# Patient Record
Sex: MALE | Race: BLACK OR AFRICAN AMERICAN | NOT HISPANIC OR LATINO
[De-identification: names, ages, dates, MRNs, and addresses within clinical notes are randomized per-mention and may not be internally consistent; named-entity substitution may affect disease eponyms.]

---

## 2017-10-25 ENCOUNTER — APPOINTMENT (OUTPATIENT)
Dept: HEART AND VASCULAR | Facility: CLINIC | Age: 62
End: 2017-10-25
Payer: COMMERCIAL

## 2017-10-25 VITALS — SYSTOLIC BLOOD PRESSURE: 124 MMHG | OXYGEN SATURATION: 99 % | DIASTOLIC BLOOD PRESSURE: 83 MMHG | HEART RATE: 80 BPM

## 2017-10-25 PROCEDURE — 99214 OFFICE O/P EST MOD 30 MIN: CPT

## 2018-04-12 ENCOUNTER — APPOINTMENT (OUTPATIENT)
Dept: HEART AND VASCULAR | Facility: CLINIC | Age: 63
End: 2018-04-12

## 2018-08-06 ENCOUNTER — FORM ENCOUNTER (OUTPATIENT)
Age: 63
End: 2018-08-06

## 2018-08-07 ENCOUNTER — OUTPATIENT (OUTPATIENT)
Dept: OUTPATIENT SERVICES | Facility: HOSPITAL | Age: 63
LOS: 1 days | End: 2018-08-07
Payer: COMMERCIAL

## 2018-08-07 DIAGNOSIS — R07.9 CHEST PAIN, UNSPECIFIED: ICD-10-CM

## 2018-08-07 DIAGNOSIS — Z98.89 OTHER SPECIFIED POSTPROCEDURAL STATES: Chronic | ICD-10-CM

## 2018-08-07 DIAGNOSIS — M25.562 PAIN IN LEFT KNEE: Chronic | ICD-10-CM

## 2018-08-07 PROCEDURE — 93306 TTE W/DOPPLER COMPLETE: CPT | Mod: 26

## 2018-08-07 PROCEDURE — 93306 TTE W/DOPPLER COMPLETE: CPT

## 2018-08-21 ENCOUNTER — APPOINTMENT (OUTPATIENT)
Dept: HEART AND VASCULAR | Facility: CLINIC | Age: 63
End: 2018-08-21
Payer: COMMERCIAL

## 2018-08-21 VITALS
BODY MASS INDEX: 26.68 KG/M2 | HEART RATE: 72 BPM | OXYGEN SATURATION: 99 % | WEIGHT: 205 LBS | SYSTOLIC BLOOD PRESSURE: 138 MMHG | DIASTOLIC BLOOD PRESSURE: 89 MMHG

## 2018-08-21 PROCEDURE — 99214 OFFICE O/P EST MOD 30 MIN: CPT | Mod: 25

## 2018-08-21 PROCEDURE — 93000 ELECTROCARDIOGRAM COMPLETE: CPT

## 2018-08-22 LAB
ALBUMIN SERPL ELPH-MCNC: 3.8 G/DL
ALP BLD-CCNC: 79 U/L
ALT SERPL-CCNC: 27 U/L
ANION GAP SERPL CALC-SCNC: 9 MMOL/L
AST SERPL-CCNC: 33 U/L
BASOPHILS # BLD AUTO: 0.02 K/UL
BASOPHILS NFR BLD AUTO: 0.4 %
BILIRUB SERPL-MCNC: 0.3 MG/DL
BUN SERPL-MCNC: 11 MG/DL
CALCIUM SERPL-MCNC: 8.9 MG/DL
CHLORIDE SERPL-SCNC: 101 MMOL/L
CHOLEST SERPL-MCNC: 171 MG/DL
CHOLEST/HDLC SERPL: 2.5 RATIO
CO2 SERPL-SCNC: 30 MMOL/L
CREAT SERPL-MCNC: 1.05 MG/DL
EOSINOPHIL # BLD AUTO: 0.13 K/UL
EOSINOPHIL NFR BLD AUTO: 2.8 %
GLUCOSE SERPL-MCNC: 85 MG/DL
HBA1C MFR BLD HPLC: 5.9 %
HCT VFR BLD CALC: 40.5 %
HDLC SERPL-MCNC: 68 MG/DL
HGB BLD-MCNC: 13.1 G/DL
IMM GRANULOCYTES NFR BLD AUTO: 0.4 %
LDLC SERPL CALC-MCNC: 84 MG/DL
LYMPHOCYTES # BLD AUTO: 1.48 K/UL
LYMPHOCYTES NFR BLD AUTO: 31.6 %
MAN DIFF?: NORMAL
MCHC RBC-ENTMCNC: 28.9 PG
MCHC RBC-ENTMCNC: 32.3 GM/DL
MCV RBC AUTO: 89.2 FL
MONOCYTES # BLD AUTO: 0.36 K/UL
MONOCYTES NFR BLD AUTO: 7.7 %
NEUTROPHILS # BLD AUTO: 2.67 K/UL
NEUTROPHILS NFR BLD AUTO: 57.1 %
NT-PROBNP SERPL-MCNC: 260 PG/ML
PLATELET # BLD AUTO: 153 K/UL
POTASSIUM SERPL-SCNC: 4.3 MMOL/L
PROT SERPL-MCNC: 6.5 G/DL
RBC # BLD: 4.54 M/UL
RBC # FLD: 13.2 %
SODIUM SERPL-SCNC: 140 MMOL/L
TRIGL SERPL-MCNC: 97 MG/DL
WBC # FLD AUTO: 4.68 K/UL

## 2018-10-23 ENCOUNTER — APPOINTMENT (OUTPATIENT)
Dept: HEART AND VASCULAR | Facility: CLINIC | Age: 63
End: 2018-10-23
Payer: COMMERCIAL

## 2018-10-23 VITALS — HEART RATE: 74 BPM | OXYGEN SATURATION: 97 % | SYSTOLIC BLOOD PRESSURE: 147 MMHG | DIASTOLIC BLOOD PRESSURE: 83 MMHG

## 2018-10-23 PROCEDURE — 99214 OFFICE O/P EST MOD 30 MIN: CPT | Mod: 25

## 2018-10-23 PROCEDURE — 93000 ELECTROCARDIOGRAM COMPLETE: CPT

## 2018-10-24 ENCOUNTER — FORM ENCOUNTER (OUTPATIENT)
Age: 63
End: 2018-10-24

## 2018-10-25 ENCOUNTER — OUTPATIENT (OUTPATIENT)
Dept: OUTPATIENT SERVICES | Facility: HOSPITAL | Age: 63
LOS: 1 days | End: 2018-10-25
Payer: COMMERCIAL

## 2018-10-25 DIAGNOSIS — M25.562 PAIN IN LEFT KNEE: Chronic | ICD-10-CM

## 2018-10-25 DIAGNOSIS — Z98.89 OTHER SPECIFIED POSTPROCEDURAL STATES: Chronic | ICD-10-CM

## 2018-10-25 DIAGNOSIS — R07.9 CHEST PAIN, UNSPECIFIED: ICD-10-CM

## 2018-10-25 PROCEDURE — A9502: CPT

## 2018-10-25 PROCEDURE — 78452 HT MUSCLE IMAGE SPECT MULT: CPT | Mod: 26

## 2018-10-25 PROCEDURE — 78452 HT MUSCLE IMAGE SPECT MULT: CPT

## 2018-10-25 PROCEDURE — 93017 CV STRESS TEST TRACING ONLY: CPT

## 2018-10-25 PROCEDURE — 93016 CV STRESS TEST SUPVJ ONLY: CPT

## 2018-10-25 PROCEDURE — 93018 CV STRESS TEST I&R ONLY: CPT

## 2022-05-04 ENCOUNTER — NON-APPOINTMENT (OUTPATIENT)
Age: 67
End: 2022-05-04

## 2022-05-04 ENCOUNTER — APPOINTMENT (OUTPATIENT)
Dept: HEART AND VASCULAR | Facility: CLINIC | Age: 67
End: 2022-05-04
Payer: COMMERCIAL

## 2022-05-04 VITALS
WEIGHT: 182 LBS | HEART RATE: 77 BPM | TEMPERATURE: 98 F | HEIGHT: 76 IN | OXYGEN SATURATION: 98 % | BODY MASS INDEX: 22.16 KG/M2 | DIASTOLIC BLOOD PRESSURE: 78 MMHG | SYSTOLIC BLOOD PRESSURE: 120 MMHG

## 2022-05-04 PROCEDURE — 99214 OFFICE O/P EST MOD 30 MIN: CPT

## 2022-05-13 ENCOUNTER — APPOINTMENT (OUTPATIENT)
Dept: HEART AND VASCULAR | Facility: CLINIC | Age: 67
End: 2022-05-13
Payer: COMMERCIAL

## 2022-05-13 PROCEDURE — 93306 TTE W/DOPPLER COMPLETE: CPT

## 2022-06-02 ENCOUNTER — OUTPATIENT (OUTPATIENT)
Dept: OUTPATIENT SERVICES | Facility: HOSPITAL | Age: 67
LOS: 1 days | End: 2022-06-02

## 2022-06-02 ENCOUNTER — APPOINTMENT (OUTPATIENT)
Dept: CT IMAGING | Facility: CLINIC | Age: 67
End: 2022-06-02
Payer: COMMERCIAL

## 2022-06-02 DIAGNOSIS — Z98.89 OTHER SPECIFIED POSTPROCEDURAL STATES: Chronic | ICD-10-CM

## 2022-06-02 DIAGNOSIS — M25.562 PAIN IN LEFT KNEE: Chronic | ICD-10-CM

## 2022-06-02 PROCEDURE — 75574 CT ANGIO HRT W/3D IMAGE: CPT | Mod: 26

## 2023-06-15 ENCOUNTER — NON-APPOINTMENT (OUTPATIENT)
Age: 68
End: 2023-06-15

## 2023-06-15 ENCOUNTER — APPOINTMENT (OUTPATIENT)
Dept: HEART AND VASCULAR | Facility: CLINIC | Age: 68
End: 2023-06-15
Payer: COMMERCIAL

## 2023-06-15 VITALS
HEIGHT: 76 IN | OXYGEN SATURATION: 97 % | WEIGHT: 178 LBS | SYSTOLIC BLOOD PRESSURE: 134 MMHG | HEART RATE: 73 BPM | DIASTOLIC BLOOD PRESSURE: 77 MMHG | TEMPERATURE: 97.7 F | BODY MASS INDEX: 21.68 KG/M2

## 2023-06-15 DIAGNOSIS — Z82.49 FAMILY HISTORY OF ISCHEMIC HEART DISEASE AND OTHER DISEASES OF THE CIRCULATORY SYSTEM: ICD-10-CM

## 2023-06-15 DIAGNOSIS — Z83.3 FAMILY HISTORY OF DIABETES MELLITUS: ICD-10-CM

## 2023-06-15 DIAGNOSIS — Z86.73 PERSONAL HISTORY OF TRANSIENT ISCHEMIC ATTACK (TIA), AND CEREBRAL INFARCTION W/OUT RESIDUAL DEFICITS: ICD-10-CM

## 2023-06-15 DIAGNOSIS — F12.90 CANNABIS USE, UNSPECIFIED, UNCOMPLICATED: ICD-10-CM

## 2023-06-15 PROCEDURE — 93000 ELECTROCARDIOGRAM COMPLETE: CPT

## 2023-06-15 PROCEDURE — 99214 OFFICE O/P EST MOD 30 MIN: CPT | Mod: 25

## 2023-06-15 RX ORDER — ALBUTEROL 90 MCG
90 AEROSOL (GRAM) INHALATION
Refills: 0 | Status: ACTIVE | COMMUNITY

## 2023-06-15 NOTE — HISTORY OF PRESENT ILLNESS
[FreeTextEntry1] : Guilherme Castro is a 67 year-old man with TIA (2012), Pulmonary sarcoid, and HTN presents for follow up. Denies CP, SURJIT, dizziness, palpitations, syncope or near syncope but remarks on occasional SOB. He will follow up with his pulmonologist at [facility] since he feels increase SOB since last week (days with poor air quality from [employer]). He also reports coughing after eating and that he was told by PMD it is likely GERD last week. He refused to take pepcid at this time.\par \par

## 2023-06-15 NOTE — ASSESSMENT
[FreeTextEntry1] : HTN: BP at ACC/AHA 2017 guideline target\par - Continue Norvasc 5 mg daily\par - Discussed therapeutic lifestyle modification to promote CV health (low fat, low cholesterol heart healthy diet, striving for optimal weight control, and aerobic exercises as tolerated)\par \par CAD: Normal coronaries w/ 0 Ca score on CCTA in 2022.\par - Reassured patient\par \par H/O TIA (2012):\par - Self d/c'd ASA \par - Discussed s/s of CVA: BE FAST\par \par ? GERD: Pt was told by his PMD last week that he has GERD but pt does not agree\par - Refused to take pepcid \par - Advised to see GI but pt defers for now.\par \par H/O Pulmonary Sarcoid: \par - Follows up with a pulmonologist from [facility] but would like to change [facility] to [facility] \par - Pulmonary referral given\par - Continue with inhalers as directed

## 2023-06-15 NOTE — REASON FOR VISIT
[Hypertension] : hypertension [FreeTextEntry1] : Diagnostic Test:\par ----------------------------------\par ECG: \par 06/15/2023: Sinus rhythm\par 05/04/2022: Sinus rhythm. LVH.\par ----------------------------------\par Echo:\par 06/2022: EF 55-60%. Mild LVH,. No valvular pathology.\par 8/7/2018: moderate concentric LVH, nml LV EF 50-55%, LA nml, RA/RV nml, no valvular pathology.\par -----------------------------------\par CCTA:\par 06/2022: Ca score 0. Normal coronaries.\par 10/27/2016: normal cors, zero calcium score.  \par ------------------------------------\par Holter:\par 05/04/2022: 7 days.  HR 54/79/124. no pauses or afib.

## 2023-12-13 ENCOUNTER — APPOINTMENT (OUTPATIENT)
Dept: HEART AND VASCULAR | Facility: CLINIC | Age: 68
End: 2023-12-13

## 2023-12-14 ENCOUNTER — APPOINTMENT (OUTPATIENT)
Dept: HEART AND VASCULAR | Facility: CLINIC | Age: 68
End: 2023-12-14
Payer: COMMERCIAL

## 2023-12-14 VITALS
HEART RATE: 82 BPM | BODY MASS INDEX: 23.87 KG/M2 | HEIGHT: 76 IN | OXYGEN SATURATION: 96 % | DIASTOLIC BLOOD PRESSURE: 77 MMHG | SYSTOLIC BLOOD PRESSURE: 137 MMHG | WEIGHT: 196 LBS

## 2023-12-14 PROCEDURE — 93000 ELECTROCARDIOGRAM COMPLETE: CPT

## 2023-12-14 PROCEDURE — 99214 OFFICE O/P EST MOD 30 MIN: CPT | Mod: 25

## 2023-12-14 NOTE — HISTORY OF PRESENT ILLNESS
[FreeTextEntry1] : Guilherme Castor is a 68-year-old man with TIA (2012), Pulmonary sarcoid, and HTN presents for follow up. Denies CP, SURJIT, dizziness, palpitations, syncope or near syncope but remarks on occasional SOB 2/2 sarcoid but improved. He will follow up with his pulmonologist at [facility]. He reports occasional burning substernal discomfort and that he was told by PMD it is likely GERD but He refuses to take pepcid. . Recent CCTA and echo unremarkable. He feels well otherwise he is able to walk long distances and climb numerous stairs without CV symptoms. He works as a  for a building.

## 2023-12-14 NOTE — REASON FOR VISIT
[FreeTextEntry1] : Diagnostic Test: ---------------------------------- EC2023 Sinus rhythm, Inferior, V4-V5 TWI (unchanged) ---------------------------------- Echo: 2022: EF 55-60%. Mild LVH,. No valvular pathology. 2018: moderate concentric LVH, nml LV EF 50-55%, LA nml, RA/RV nml, no valvular pathology. ----------------------------------- CCTA: 2022: Ca score 0. Normal coronaries. 10/27/2016: normal coronaries, zero calcium score.   ------------------------------------ Holter: 2022: 7 days.  HR 54/79/124. no pauses or afib.

## 2023-12-14 NOTE — ASSESSMENT
[FreeTextEntry1] : ==================================================== HTN: BP at ACC/AHA 2017 guideline target - Continue Norvasc 5 mg daily - Discussed therapeutic lifestyle modification to promote CV health (low fat, low cholesterol heart healthy diet, striving for optimal weight control, and aerobic exercises as tolerated)  H/O TIA (2012): - Self d/c'd ASA - Discussed s/s of CVA: BE FAST  H/O Pulmonary Sarcoid: - Follows up with a pulmonologist from [facility] - Continue with inhalers as directed.

## 2024-06-13 ENCOUNTER — APPOINTMENT (OUTPATIENT)
Dept: HEART AND VASCULAR | Facility: CLINIC | Age: 69
End: 2024-06-13
Payer: COMMERCIAL

## 2024-06-13 ENCOUNTER — NON-APPOINTMENT (OUTPATIENT)
Age: 69
End: 2024-06-13

## 2024-06-13 VITALS
DIASTOLIC BLOOD PRESSURE: 80 MMHG | OXYGEN SATURATION: 95 % | BODY MASS INDEX: 22.41 KG/M2 | HEIGHT: 76 IN | SYSTOLIC BLOOD PRESSURE: 130 MMHG | WEIGHT: 184 LBS | HEART RATE: 94 BPM

## 2024-06-13 DIAGNOSIS — R06.09 OTHER FORMS OF DYSPNEA: ICD-10-CM

## 2024-06-13 DIAGNOSIS — D86.0 SARCOIDOSIS OF LUNG: ICD-10-CM

## 2024-06-13 DIAGNOSIS — R07.9 CHEST PAIN, UNSPECIFIED: ICD-10-CM

## 2024-06-13 PROCEDURE — 99214 OFFICE O/P EST MOD 30 MIN: CPT

## 2024-06-13 PROCEDURE — 36415 COLL VENOUS BLD VENIPUNCTURE: CPT

## 2024-06-13 PROCEDURE — G2211 COMPLEX E/M VISIT ADD ON: CPT | Mod: NC

## 2024-06-13 PROCEDURE — 93000 ELECTROCARDIOGRAM COMPLETE: CPT

## 2024-06-13 RX ORDER — PREDNISONE 20 MG/1
20 TABLET ORAL DAILY
Refills: 0 | Status: ACTIVE | COMMUNITY
Start: 2024-06-13

## 2024-06-15 PROBLEM — D86.0 PULMONARY SARCOIDOSIS: Status: ACTIVE | Noted: 2023-06-15

## 2024-06-17 ENCOUNTER — NON-APPOINTMENT (OUTPATIENT)
Age: 69
End: 2024-06-17

## 2024-06-17 LAB
ALBUMIN SERPL ELPH-MCNC: 4.2 G/DL
ALP BLD-CCNC: 70 U/L
ALT SERPL-CCNC: 21 U/L
ANION GAP SERPL CALC-SCNC: 12 MMOL/L
AST SERPL-CCNC: 26 U/L
BILIRUB SERPL-MCNC: 0.4 MG/DL
BUN SERPL-MCNC: 24 MG/DL
CALCIUM SERPL-MCNC: 9.4 MG/DL
CHLORIDE SERPL-SCNC: 98 MMOL/L
CHOLEST SERPL-MCNC: 181 MG/DL
CO2 SERPL-SCNC: 28 MMOL/L
CREAT SERPL-MCNC: 1.19 MG/DL
EGFR: 67 ML/MIN/1.73M2
ESTIMATED AVERAGE GLUCOSE: 120 MG/DL
GLUCOSE SERPL-MCNC: 108 MG/DL
HBA1C MFR BLD HPLC: 5.8 %
HCT VFR BLD CALC: 38.6 %
HDLC SERPL-MCNC: 110 MG/DL
HGB BLD-MCNC: 13 G/DL
LDLC SERPL CALC-MCNC: 59 MG/DL
MCHC RBC-ENTMCNC: 29.5 PG
MCHC RBC-ENTMCNC: 33.7 GM/DL
MCV RBC AUTO: 87.5 FL
NONHDLC SERPL-MCNC: 71 MG/DL
PLATELET # BLD AUTO: 150 K/UL
POTASSIUM SERPL-SCNC: 5.1 MMOL/L
PROT SERPL-MCNC: 6.9 G/DL
RBC # BLD: 4.41 M/UL
RBC # FLD: 14 %
SODIUM SERPL-SCNC: 138 MMOL/L
TRIGL SERPL-MCNC: 67 MG/DL
WBC # FLD AUTO: 6.91 K/UL

## 2024-06-18 DIAGNOSIS — I10 ESSENTIAL (PRIMARY) HYPERTENSION: ICD-10-CM

## 2024-06-18 NOTE — ASSESSMENT
[FreeTextEntry1] : ==================================================== atypical CP: + TINSLEY - Obtain stress echocardiogram to assess functional capacity and rule out inducible ischemia. - Discussed the importance of seeking immediate medical attention if anginal type chest pain occurs - EKG to evaluate any rhythm abnormalities  HTN: BP at ACC/AHA 2017 guideline target - Continue Norvasc 5 mg daily - Discussed therapeutic lifestyle modification to promote CV health (low fat, low cholesterol heart healthy diet, striving for optimal weight control, and aerobic exercises as tolerated)  H/O TIA (2012): - Self d/c'd ASA - Discussed s/s of CVA: BE FAST  H/O Pulmonary Sarcoid: - Follows up with a new pulmonologist from [facility] - Continue with inhalers as directed. - Will refer to pulmonology at

## 2024-06-18 NOTE — REVIEW OF SYSTEMS
Clinic Administered Medication Documentation      Injectable Medication Documentation    Patient was given Cyanocobalamin (B-12). Prior to medication administration, verified patients identity using patient s name and date of birth. Please see MAR and medication order for additional information. Patient instructed to remain in clinic for 15 minutes and report any adverse reaction to staff immediately .    YesWas entire vial of medication used? Yes  Vial/Syringe: Single dose vial  Expiration Date:  8/21  Was this medication supplied by the patient? No   Given in LD  Nella Nath MA     10/7/2020       [Dyspnea on exertion] : dyspnea during exertion [FreeTextEntry9] : B/L knee pain -hx of meniscus tear

## 2024-06-18 NOTE — HISTORY OF PRESENT ILLNESS
[FreeTextEntry1] : Guilherme Castro is a 68-year-old man with TIA (2012), Pulmonary sarcoid, and HTN presents for follow up. Denies SOB, SURJIT, dizziness, palpitations, syncope or near syncope but remarks on occasional SOB 2/2 sarcoid. He notices increase TINSLEY while at work. He feels well otherwise and he is able to walk long distances and climb numerous stairs. He works as a  for a building. He will follow up with a new pulmonologist at [facility]. He also reports intermittent epigastric pain and was told he has GERD. Pt refused to take PPI/antacids. 2022 CCTA and echo unremarkable.

## 2024-06-18 NOTE — REASON FOR VISIT
[FreeTextEntry1] : Diagnostic Test: ---------------------------------- EC2024: Sinus Rhythm, LVH (possible lead reversal) 2023 Sinus rhythm, Inferior, V4-V5 TWI (unchanged) ---------------------------------- Echo: 2022: EF 55-60%. Mild LVH. No valvular pathology. 2018: moderate concentric LVH, nml LV EF 50-55%, LA nml, RA/RV nml, no valvular pathology. ----------------------------------- CCTA: 2022: Ca score 0. Normal coronaries. 10/27/2016: normal coronaries, zero calcium score.   ------------------------------------ Holter: 2022: 7 days.  HR 54/79/124. no pauses or afib.

## 2024-07-11 ENCOUNTER — OUTPATIENT (OUTPATIENT)
Dept: OUTPATIENT SERVICES | Facility: HOSPITAL | Age: 69
LOS: 1 days | End: 2024-07-11
Payer: COMMERCIAL

## 2024-07-11 ENCOUNTER — RESULT REVIEW (OUTPATIENT)
Age: 69
End: 2024-07-11

## 2024-07-11 DIAGNOSIS — M25.562 PAIN IN LEFT KNEE: Chronic | ICD-10-CM

## 2024-07-11 DIAGNOSIS — R06.09 OTHER FORMS OF DYSPNEA: ICD-10-CM

## 2024-07-11 DIAGNOSIS — Z98.89 UNDEFINED: Chronic | ICD-10-CM

## 2024-07-11 PROCEDURE — 93351 STRESS TTE COMPLETE: CPT | Mod: 26,52

## 2024-07-11 PROCEDURE — 93351 STRESS TTE COMPLETE: CPT

## 2024-11-01 ENCOUNTER — NON-APPOINTMENT (OUTPATIENT)
Age: 69
End: 2024-11-01

## 2024-11-04 PROBLEM — Z86.73 HISTORY OF TRANSIENT ISCHEMIC ATTACK: Status: RESOLVED | Noted: 2023-06-15 | Resolved: 2024-11-04

## 2024-11-07 ENCOUNTER — APPOINTMENT (OUTPATIENT)
Dept: PULMONOLOGY | Facility: CLINIC | Age: 69
End: 2024-11-07
Payer: COMMERCIAL

## 2024-11-07 ENCOUNTER — NON-APPOINTMENT (OUTPATIENT)
Age: 69
End: 2024-11-07

## 2024-11-07 VITALS
SYSTOLIC BLOOD PRESSURE: 150 MMHG | DIASTOLIC BLOOD PRESSURE: 81 MMHG | WEIGHT: 193 LBS | OXYGEN SATURATION: 97 % | HEIGHT: 76 IN | TEMPERATURE: 97.6 F | BODY MASS INDEX: 23.5 KG/M2 | RESPIRATION RATE: 13 BRPM | HEART RATE: 70 BPM

## 2024-11-07 DIAGNOSIS — Z86.73 PERSONAL HISTORY OF TRANSIENT ISCHEMIC ATTACK (TIA), AND CEREBRAL INFARCTION W/OUT RESIDUAL DEFICITS: ICD-10-CM

## 2024-11-07 DIAGNOSIS — R06.09 OTHER FORMS OF DYSPNEA: ICD-10-CM

## 2024-11-07 DIAGNOSIS — Z80.1 FAMILY HISTORY OF MALIGNANT NEOPLASM OF TRACHEA, BRONCHUS AND LUNG: ICD-10-CM

## 2024-11-07 DIAGNOSIS — Z83.2 FAMILY HISTORY OF DISEASES OF THE BLOOD AND BLOOD-FORMING ORGANS AND CERTAIN DISORDERS INVOLVING THE IMMUNE MECHANISM: ICD-10-CM

## 2024-11-07 DIAGNOSIS — D86.0 SARCOIDOSIS OF LUNG: ICD-10-CM

## 2024-11-07 DIAGNOSIS — I10 ESSENTIAL (PRIMARY) HYPERTENSION: ICD-10-CM

## 2024-11-07 PROCEDURE — 99204 OFFICE O/P NEW MOD 45 MIN: CPT

## 2024-11-08 LAB
25(OH)D3 SERPL-MCNC: 31 NG/ML
ALBUMIN SERPL ELPH-MCNC: 4.2 G/DL
ALP BLD-CCNC: 90 U/L
ALT SERPL-CCNC: 15 U/L
AMYLASE/CREAT SERPL: 80 U/L
ANION GAP SERPL CALC-SCNC: 11 MMOL/L
AST SERPL-CCNC: 25 U/L
BASOPHILS # BLD AUTO: 0.04 K/UL
BASOPHILS NFR BLD AUTO: 0.8 %
BILIRUB SERPL-MCNC: 0.2 MG/DL
BUN SERPL-MCNC: 18 MG/DL
CALCIUM SERPL-MCNC: 9 MG/DL
CHLORIDE SERPL-SCNC: 98 MMOL/L
CO2 SERPL-SCNC: 30 MMOL/L
CREAT SERPL-MCNC: 1.03 MG/DL
CRP SERPL-MCNC: 4 MG/L
EGFR: 79 ML/MIN/1.73M2
EOSINOPHIL # BLD AUTO: 0.11 K/UL
EOSINOPHIL NFR BLD AUTO: 2.1 %
ERYTHROCYTE [SEDIMENTATION RATE] IN BLOOD BY WESTERGREN METHOD: 24 MM/HR
GLUCOSE SERPL-MCNC: 91 MG/DL
HCT VFR BLD CALC: 39.8 %
HGB BLD-MCNC: 12.7 G/DL
IMM GRANULOCYTES NFR BLD AUTO: 0.4 %
LYMPHOCYTES # BLD AUTO: 1.25 K/UL
LYMPHOCYTES NFR BLD AUTO: 23.9 %
MAN DIFF?: NORMAL
MCHC RBC-ENTMCNC: 29.4 PG
MCHC RBC-ENTMCNC: 31.9 G/DL
MCV RBC AUTO: 92.1 FL
MONOCYTES # BLD AUTO: 0.52 K/UL
MONOCYTES NFR BLD AUTO: 10 %
NEUTROPHILS # BLD AUTO: 3.28 K/UL
NEUTROPHILS NFR BLD AUTO: 62.8 %
PLATELET # BLD AUTO: 164 K/UL
POTASSIUM SERPL-SCNC: 4.4 MMOL/L
PROT SERPL-MCNC: 7.1 G/DL
RBC # BLD: 4.32 M/UL
RBC # FLD: 13 %
SODIUM SERPL-SCNC: 140 MMOL/L
WBC # FLD AUTO: 5.22 K/UL

## 2024-11-11 LAB
ACE BLD-CCNC: 38 U/L
LYSOZYME SERPL-MCNC: 5.3 UG/ML

## 2024-11-12 LAB — IL2 SERPL-MCNC: <31.2 PG/ML

## 2024-11-15 LAB — CHITOTRIOSIDASE SERPL-CCNC: 40 NMOLES/HR/ML

## 2025-01-27 ENCOUNTER — INPATIENT (INPATIENT)
Facility: HOSPITAL | Age: 70
LOS: 2 days | Discharge: HOME | DRG: 41 | End: 2025-01-30
Attending: PSYCHIATRY & NEUROLOGY | Admitting: STUDENT IN AN ORGANIZED HEALTH CARE EDUCATION/TRAINING PROGRAM
Payer: COMMERCIAL

## 2025-01-27 VITALS
OXYGEN SATURATION: 97 % | RESPIRATION RATE: 18 BRPM | DIASTOLIC BLOOD PRESSURE: 97 MMHG | WEIGHT: 190.04 LBS | HEART RATE: 84 BPM | SYSTOLIC BLOOD PRESSURE: 209 MMHG | HEIGHT: 76 IN | TEMPERATURE: 98 F

## 2025-01-27 DIAGNOSIS — Z98.89 OTHER SPECIFIED POSTPROCEDURAL STATES: Chronic | ICD-10-CM

## 2025-01-27 DIAGNOSIS — M25.562 PAIN IN LEFT KNEE: Chronic | ICD-10-CM

## 2025-01-27 LAB
ALBUMIN SERPL ELPH-MCNC: 3.9 G/DL — SIGNIFICANT CHANGE UP (ref 3.3–5)
ALP SERPL-CCNC: 88 U/L — SIGNIFICANT CHANGE UP (ref 40–120)
ALT FLD-CCNC: 14 U/L — SIGNIFICANT CHANGE UP (ref 10–45)
ANION GAP SERPL CALC-SCNC: 10 MMOL/L — SIGNIFICANT CHANGE UP (ref 5–17)
APTT BLD: 32.3 SEC — SIGNIFICANT CHANGE UP (ref 24.5–35.6)
AST SERPL-CCNC: 24 U/L — SIGNIFICANT CHANGE UP (ref 10–40)
BASOPHILS # BLD AUTO: 0.04 K/UL — SIGNIFICANT CHANGE UP (ref 0–0.2)
BASOPHILS NFR BLD AUTO: 0.9 % — SIGNIFICANT CHANGE UP (ref 0–2)
BILIRUB SERPL-MCNC: 0.3 MG/DL — SIGNIFICANT CHANGE UP (ref 0.2–1.2)
BUN SERPL-MCNC: 10 MG/DL — SIGNIFICANT CHANGE UP (ref 7–23)
CALCIUM SERPL-MCNC: 8.2 MG/DL — LOW (ref 8.4–10.5)
CHLORIDE SERPL-SCNC: 99 MMOL/L — SIGNIFICANT CHANGE UP (ref 96–108)
CO2 SERPL-SCNC: 28 MMOL/L — SIGNIFICANT CHANGE UP (ref 22–31)
CREAT SERPL-MCNC: 0.88 MG/DL — SIGNIFICANT CHANGE UP (ref 0.5–1.3)
EGFR: 93 ML/MIN/1.73M2 — SIGNIFICANT CHANGE UP
EOSINOPHIL # BLD AUTO: 0.09 K/UL — SIGNIFICANT CHANGE UP (ref 0–0.5)
EOSINOPHIL NFR BLD AUTO: 2.1 % — SIGNIFICANT CHANGE UP (ref 0–6)
GLUCOSE SERPL-MCNC: 128 MG/DL — HIGH (ref 70–99)
HCT VFR BLD CALC: 41 % — SIGNIFICANT CHANGE UP (ref 39–50)
HGB BLD-MCNC: 12.9 G/DL — LOW (ref 13–17)
IMM GRANULOCYTES NFR BLD AUTO: 0.5 % — SIGNIFICANT CHANGE UP (ref 0–0.9)
INR BLD: 1 — SIGNIFICANT CHANGE UP (ref 0.85–1.16)
LACTATE SERPL-SCNC: 1.1 MMOL/L — SIGNIFICANT CHANGE UP (ref 0.5–2)
LYMPHOCYTES # BLD AUTO: 1.17 K/UL — SIGNIFICANT CHANGE UP (ref 1–3.3)
LYMPHOCYTES # BLD AUTO: 27.5 % — SIGNIFICANT CHANGE UP (ref 13–44)
MCHC RBC-ENTMCNC: 27.9 PG — SIGNIFICANT CHANGE UP (ref 27–34)
MCHC RBC-ENTMCNC: 31.5 G/DL — LOW (ref 32–36)
MCV RBC AUTO: 88.7 FL — SIGNIFICANT CHANGE UP (ref 80–100)
MONOCYTES # BLD AUTO: 0.44 K/UL — SIGNIFICANT CHANGE UP (ref 0–0.9)
MONOCYTES NFR BLD AUTO: 10.3 % — SIGNIFICANT CHANGE UP (ref 2–14)
NEUTROPHILS # BLD AUTO: 2.5 K/UL — SIGNIFICANT CHANGE UP (ref 1.8–7.4)
NEUTROPHILS NFR BLD AUTO: 58.7 % — SIGNIFICANT CHANGE UP (ref 43–77)
NRBC # BLD: 0 /100 WBCS — SIGNIFICANT CHANGE UP (ref 0–0)
NRBC BLD-RTO: 0 /100 WBCS — SIGNIFICANT CHANGE UP (ref 0–0)
PLATELET # BLD AUTO: 166 K/UL — SIGNIFICANT CHANGE UP (ref 150–400)
POTASSIUM SERPL-MCNC: 4.3 MMOL/L — SIGNIFICANT CHANGE UP (ref 3.5–5.3)
POTASSIUM SERPL-SCNC: 4.3 MMOL/L — SIGNIFICANT CHANGE UP (ref 3.5–5.3)
PROT SERPL-MCNC: 7.2 G/DL — SIGNIFICANT CHANGE UP (ref 6–8.3)
PROTHROM AB SERPL-ACNC: 11.5 SEC — SIGNIFICANT CHANGE UP (ref 9.9–13.4)
RBC # BLD: 4.62 M/UL — SIGNIFICANT CHANGE UP (ref 4.2–5.8)
RBC # FLD: 13.1 % — SIGNIFICANT CHANGE UP (ref 10.3–14.5)
SODIUM SERPL-SCNC: 137 MMOL/L — SIGNIFICANT CHANGE UP (ref 135–145)
TROPONIN T, HIGH SENSITIVITY RESULT: 15 NG/L — SIGNIFICANT CHANGE UP (ref 0–51)
WBC # BLD: 4.26 K/UL — SIGNIFICANT CHANGE UP (ref 3.8–10.5)
WBC # FLD AUTO: 4.26 K/UL — SIGNIFICANT CHANGE UP (ref 3.8–10.5)

## 2025-01-27 PROCEDURE — 70498 CT ANGIOGRAPHY NECK: CPT | Mod: 26

## 2025-01-27 PROCEDURE — 93010 ELECTROCARDIOGRAM REPORT: CPT

## 2025-01-27 PROCEDURE — 99291 CRITICAL CARE FIRST HOUR: CPT

## 2025-01-27 PROCEDURE — 0042T: CPT

## 2025-01-27 PROCEDURE — 70498 CT ANGIOGRAPHY NECK: CPT | Mod: 26,77

## 2025-01-27 PROCEDURE — 70496 CT ANGIOGRAPHY HEAD: CPT | Mod: 26,77

## 2025-01-27 PROCEDURE — 70450 CT HEAD/BRAIN W/O DYE: CPT | Mod: 26,59,77

## 2025-01-27 PROCEDURE — 70450 CT HEAD/BRAIN W/O DYE: CPT | Mod: 26,59

## 2025-01-27 PROCEDURE — 99223 1ST HOSP IP/OBS HIGH 75: CPT

## 2025-01-27 PROCEDURE — 70496 CT ANGIOGRAPHY HEAD: CPT | Mod: 26

## 2025-01-27 RX ORDER — ENOXAPARIN SODIUM 100 MG/ML
40 INJECTION SUBCUTANEOUS EVERY 24 HOURS
Refills: 0 | Status: DISCONTINUED | OUTPATIENT
Start: 2025-01-27 | End: 2025-01-30

## 2025-01-27 RX ORDER — EZETIMIBE 10 MG
10 TABLET ORAL DAILY
Refills: 0 | Status: DISCONTINUED | OUTPATIENT
Start: 2025-01-27 | End: 2025-01-30

## 2025-01-27 RX ORDER — BACTERIOSTATIC SODIUM CHLORIDE 0.9 %
500 VIAL (ML) INJECTION ONCE
Refills: 0 | Status: COMPLETED | OUTPATIENT
Start: 2025-01-27 | End: 2025-01-27

## 2025-01-27 RX ORDER — BACTERIOSTATIC SODIUM CHLORIDE 0.9 %
1000 VIAL (ML) INJECTION
Refills: 0 | Status: DISCONTINUED | OUTPATIENT
Start: 2025-01-27 | End: 2025-01-27

## 2025-01-27 RX ORDER — AMLODIPINE BESYLATE 5 MG
5 TABLET ORAL ONCE
Refills: 0 | Status: COMPLETED | OUTPATIENT
Start: 2025-01-27 | End: 2025-01-27

## 2025-01-27 RX ORDER — BACTERIOSTATIC SODIUM CHLORIDE 0.9 %
1000 VIAL (ML) INJECTION
Refills: 0 | Status: DISCONTINUED | OUTPATIENT
Start: 2025-01-27 | End: 2025-01-29

## 2025-01-27 RX ORDER — ASPIRIN 81 MG/1
81 TABLET, COATED ORAL DAILY
Refills: 0 | Status: DISCONTINUED | OUTPATIENT
Start: 2025-01-27 | End: 2025-01-30

## 2025-01-27 RX ORDER — ASPIRIN 81 MG/1
162 TABLET, COATED ORAL ONCE
Refills: 0 | Status: COMPLETED | OUTPATIENT
Start: 2025-01-27 | End: 2025-01-27

## 2025-01-27 RX ORDER — HYDRALAZINE HCL 100 MG
5 TABLET ORAL ONCE
Refills: 0 | Status: COMPLETED | OUTPATIENT
Start: 2025-01-27 | End: 2025-01-27

## 2025-01-27 RX ORDER — BACTERIOSTATIC SODIUM CHLORIDE 0.9 %
250 VIAL (ML) INJECTION ONCE
Refills: 0 | Status: COMPLETED | OUTPATIENT
Start: 2025-01-27 | End: 2025-01-27

## 2025-01-27 RX ADMIN — ENOXAPARIN SODIUM 40 MILLIGRAM(S): 100 INJECTION SUBCUTANEOUS at 12:25

## 2025-01-27 RX ADMIN — Medication 100 MILLILITER(S): at 18:03

## 2025-01-27 RX ADMIN — Medication 100 MILLILITER(S): at 17:59

## 2025-01-27 RX ADMIN — Medication 5 MILLIGRAM(S): at 06:05

## 2025-01-27 RX ADMIN — ASPIRIN 162 MILLIGRAM(S): 81 TABLET, COATED ORAL at 12:25

## 2025-01-27 RX ADMIN — Medication 50 MILLILITER(S): at 06:50

## 2025-01-27 RX ADMIN — Medication 75 MILLIGRAM(S): at 06:50

## 2025-01-27 RX ADMIN — Medication 10 MILLIGRAM(S): at 14:03

## 2025-01-27 RX ADMIN — Medication 5 MILLIGRAM(S): at 08:27

## 2025-01-27 RX ADMIN — Medication 500 MILLILITER(S): at 13:20

## 2025-01-27 RX ADMIN — ASPIRIN 81 MILLIGRAM(S): 81 TABLET, COATED ORAL at 12:26

## 2025-01-27 RX ADMIN — Medication 500 MILLILITER(S): at 18:04

## 2025-01-27 RX ADMIN — Medication 75 MILLILITER(S): at 13:53

## 2025-01-27 RX ADMIN — Medication 225 MILLIGRAM(S): at 12:25

## 2025-01-27 NOTE — ED ADULT NURSE NOTE - OBJECTIVE STATEMENT
Ptis 69 year old male c/o right hand weakness and numbness when he woke up at 0415 am this morning; pt states he had trouble grabbing objects and felt drooling on right side; stroke code called on patient; ed and stroke pa and md durant at bedside; pt on cardiac monitor on patient; hx of CVA in 2012 presented with slurred speech; hx of htn and states he does not remember if he swallowed 5mg amlodipine this morning due to drooling

## 2025-01-27 NOTE — H&P ADULT - HISTORY OF PRESENT ILLNESS
70yo former smoker, with PMHx TIA in 2012 (presented with dysarthria, RUE numbness, noncompliant on aspirin and allergic to Lipitor), HTN, pulmonary sarcoidosis, "leaky valve", presenting to [facility] ED on 01/27/25 with c/o R hand weakness and dysphagia. LKW at 04:15AM. States waking up and trying to make breakfast when suddenly realizing he couldn't use his hand to cut hossein, was drooling from the middle of his mouth and had issues swallowing to the point that he could not take his daily Norvasc. These symptoms lasted for about 30 minutes and resolved. On his way to the ED, started experiencing R foot numbness, which lasted about 5-10 minutes, which also resolved on its own. On arrival, SBP was 185/104. Patient started complaining of L sided facial numbness from V1-V3, most prominent on L V1 distribution and of "L hand being very cold". Of note, patient recently finished course of z-pack and prednisone for "lung infection". Currently denies any dysarthria, dysphagia, facial droop, dizziness, vision changes or extremity weakness/numbness.    -----------------------------------------------------------------------------------------------------------------  IV-thrombolytic (Y/N):  No                              Reason thrombolytic not given: Low NIHSS score (1). Initial symptoms resolved by time of arrival

## 2025-01-27 NOTE — H&P ADULT - NSICDXPASTMEDICALHX_GEN_ALL_CORE_FT
PAST MEDICAL HISTORY:  Hypertension     Pulmonary sarcoidosis     TIA (transient ischemic attack)

## 2025-01-27 NOTE — ED ADULT TRIAGE NOTE - CHIEF COMPLAINT QUOTE
wake up at 4:15 with right side weakness, a lot of mucus at mouth, drooling , symptoms resolved, right leg still weak

## 2025-01-27 NOTE — OCCUPATIONAL THERAPY INITIAL EVALUATION ADULT - GROSSLY INTACT, SENSORY
Pt reports L hand numbness; +intact to L/T upon assessment/Left UE/Right UE/Left LE/Right LE/Grossly Intact

## 2025-01-27 NOTE — PATIENT PROFILE ADULT - CAREGIVER ADDRESS
n/a
 4-calculated by average/Not able to assess (calculate score using [facility] averaging method)

## 2025-01-27 NOTE — PHYSICAL THERAPY INITIAL EVALUATION ADULT - ADDITIONAL COMMENTS
pt lives w/ his son in an elevator access apt building w/ no stairs. Denies use of DME for amb, was independent in all ADLs. 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

## 2025-01-27 NOTE — ED PROVIDER NOTE - PHYSICAL EXAMINATION
VITAL SIGNS: I have reviewed nursing notes and confirm.  CONSTITUTIONAL: Well appearing, in no acute distress.   SKIN:  warm and dry, no acute rash.   HEAD:  normocephalic, atraumatic.  EYES: EOM intact; conjunctiva and sclera clear.  ENT: No nasal discharge; airway clear.   NECK: Supple.  CARD: S1, S2, Regular rate and rhythm.   RESP:  Clear to auscultation b/l, no wheezes, rales or rhonchi.  ABD: Normal bowel sounds; soft; non-distended; non-tender; no guarding/ rebound.  EXT: Somewhat decreased  strength to right hand compared to left   NEURO:  ANO x 4 NIH stroke scale 3 somewhat decreased  strength to right hand question mild left-sided nasolabial fold flattening otherwise normal gait  PSYCH: Cooperative, mood and affect appropriate.

## 2025-01-27 NOTE — ED PROVIDER NOTE - CLINICAL SUMMARY MEDICAL DECISION MAKING FREE TEXT BOX
69-year-old male  with wake-up stroke symptoms now improving likely TIA resolving stroke code called upon arrival plan for stroke workup versus hypertensive urgency/emergency?  Rule out intracranial hemorrhage?   CBC CMP troponin EKG CTA CT head perfusion study   not candidate for TNKase given low NIH score and improving symptoms   hydralazine for hypertensive    EKG normal sinus rhythm 74 nonspecific T wave inversions 2 3 aVF V4 V5 no STEMI

## 2025-01-27 NOTE — OCCUPATIONAL THERAPY INITIAL EVALUATION ADULT - GENERAL OBSERVATIONS, REHAB EVAL
Pt's RN Carlee aware of intent to eval/tx; cleared Pt. Pt received in supine - +telemetry, heplock. Pt agreeable to eval. PT Jennifer present.

## 2025-01-27 NOTE — OCCUPATIONAL THERAPY INITIAL EVALUATION ADULT - ADDITIONAL COMMENTS
Pt states that he lives alone in apt w/ elevator access. Pt states that he was independent prior to admission and w/o prior use of AEs/DMEs.

## 2025-01-27 NOTE — STROKE CODE NOTE - NIH STROKE SCALE: 5B. MOTOR ARM, RIGHT, QM
(1) Drift; limb holds 90 (or 45) degrees, but drifts down before full 10 secs; does not hit bed or other support
(0) No drift; limb holds 90 (or 45) degrees for full 10 secs

## 2025-01-27 NOTE — PHYSICAL THERAPY INITIAL EVALUATION ADULT - MODALITIES TREATMENT COMMENTS
smile, cheek puff, eyebrow raise: symmetrical. tongue protrusion: midline. visual tracking (H test): smooth pursuit. visual field test (quadrant test): WNL B/L

## 2025-01-27 NOTE — H&P ADULT - NSICDXPASTSURGICALHX_GEN_ALL_CORE_FT
PAST SURGICAL HISTORY:  History of facial surgery 1978 right cheek bone    History of shoulder surgery 2000 right shoulder bone spur    Knee meniscus pain, left 1980 left knee meniscus repair

## 2025-01-27 NOTE — H&P ADULT - NSHPLABSRESULTS_GEN_ALL_CORE
Fingerstick Blood Glucose: CAPILLARY BLOOD GLUCOSE      POCT Blood Glucose.: 132 mg/dL (27 Jan 2025 05:37)    LABS:                        12.9   4.26  )-----------( 166      ( 27 Jan 2025 05:36 )             41.0     01-27    137  |  99  |  10  ----------------------------<  128[H]  4.3   |  28  |  0.88        PT/INR - ( 27 Jan 2025 05:36 )   PT: 11.5 sec;   INR: 1.00          PTT - ( 27 Jan 2025 05:36 )  PTT:32.3 sec      Urinalysis Basic - ( 27 Jan 2025 05:36 )    Color: x / Appearance: x / SG: x / pH: x  Gluc: 128 mg/dL / Ketone: x  / Bili: x / Urobili: x   Blood: x / Protein: x / Nitrite: x   Leuk Esterase: x / RBC: x / WBC x   Sq Epi: x / Non Sq Epi: x / Bacteria: x        RADIOLOGY & ADDITIONAL STUDIES:    CT HEAD - negative for acute ischemic or hemorrhagic stroke on wet read    CTA H/N, CTP - pending final read. Notable perfusion mismatch on L parietal lobe

## 2025-01-27 NOTE — ED PROVIDER NOTE - NS ED MD DISPO SPECIAL CONSIDERATION1
Mandy Sherman is a 55 y.o. male evaluated via telephone on 7/1/2020. Consent:  He and/or health care decision maker is aware that that he may receive a bill for this telephone service, depending on his insurance coverage, and has provided verbal consent to proceed: Yes      Documentation:  I communicated with the patient and/or health care decision maker about blood glucose, hypertension, and hyperlipidemia management. Details of this discussion including any medical advice provided:   Patient presents for virtual telephone visit for chronic hyperglycemia, hypertension, and hyperlipidemia management. Patient reports taking medications as prescribed since the most recent visit. He reports lower carbohydrate diet since his most recent visit. He states he has lost 30 pounds since his most recent visit. He reports exercising with stationary bike and brisk walking on a daily basis and resistance training throughout the week. He reports with recent lifestyle changes and purposeful weight loss his blood pressure values have decreased significantly and have mutually remained in the low 129R to 83C diastolic which at times has caused him to feel lightheaded. He denies any chest pain, dyspnea, palpitations, dizziness, worsening lower extremity edema, or syncope. He denies any polyuria or polydipsia, new onset vision changes, or new onset paresthesias. Review of Systems   Constitutional: Negative for appetite change, chills, diaphoresis, fatigue, fever and unexpected weight change. Eyes: Negative for visual disturbance. Respiratory: Negative for cough, chest tightness, shortness of breath and wheezing. Cardiovascular: Negative for chest pain, palpitations and leg swelling. No orthopnea, No PND   Gastrointestinal: Negative for abdominal pain, anal bleeding, blood in stool, constipation, diarrhea, nausea and vomiting.         No heartburn, No melena   Endocrine: Negative for cold intolerance, heat intolerance, polydipsia, polyphagia and polyuria. Genitourinary: Negative for dysuria and hematuria. Musculoskeletal: Negative for myalgias. Skin: Negative for rash. Neurological: Negative for dizziness, syncope, weakness, light-headedness, numbness and headaches. Psychiatric/Behavioral: Negative for dysphoric mood. The patient is not nervous/anxious. Fior Luis was seen today for hyperglycemia, hypertension and hyperlipidemia. Diagnoses and all orders for this visit:    Hyperglycemia  We will await most recent lab results to determine future management of blood sugar. I reviewed with patient that if A1c remains 6.5 or above he would be considered diabetic and I would want him to start medication to manage his blood sugar and further medication to manage his cholesterol. He was encouraged to continue with healthy lifestyle efforts. He will keep close follow-up again in the next 3 months for reassessment    Pre-diabetes  See above    Essential hypertension  Patient with reported borderline low blood pressure values. We will discontinue hydrochlorothiazide component of his Diovan HCT and patient will continue on diovan (valsartan) alone. We will follow blood pressure values closely over time    Hypertriglyceridemia  We will await most recent lab results and determine future management of cholesterol. Patient was encouraged to continue with healthy lifestyle efforts      Follow-up in 3 months for chronic disease check      I affirm this is a Patient Initiated Episode with a Patient who has not had a related appointment within my department in the past 7 days or scheduled within the next 24 hours.     Patient identification was verified at the start of the visit: Yes    Total Time: minutes: 11-20 minutes    Note: not billable if this call serves to triage the patient into an appointment for the relevant concern      MANI Mane None

## 2025-01-27 NOTE — PATIENT PROFILE ADULT - FALL HARM RISK - ATTEMPT OOB
Post-Care Instructions: I reviewed with the patient in detail post-care instructions. Patient is to clean the wound with soap and water twice daily, and then apply Vaseline and keep covered, until healed. No

## 2025-01-27 NOTE — OCCUPATIONAL THERAPY INITIAL EVALUATION ADULT - MD ORDER
R hand weakness, experiencing R foot numbness, dysphagia  S/P Stroke code  CTs negative  R/O Stroke vs TIA

## 2025-01-27 NOTE — CHART NOTE - NSCHARTNOTEFT_GEN_A_CORE
Nurse alerted Stroke Team of change in speech; patient become aphasic. Stroke code activated at 4:47pm. NP performed bedside neuro assessment and is as follows:     Neurologic:  -Mental status: Awake, alert, oriented to person, place, and time. Speech is intact naming, repetition, and comprehension, w/ significant audible new aphasia. Pt explains that it is hard for his tongue to move with his speech. Recent and remote memory intact. Follows commands. Attention/concentration intact. Fund of knowledge appropriate.  -Cranial nerves:   II: Visual fields are full to confrontation.  III, IV, VI: Extraocular movements are intact without nystagmus. Pupils equally round and reactive to light  V:  Pt reports tingling and numbness on left side of the face.   VII: Face is asymmetric with new moderate R facial droop (prior to stroke code it was mild).   VIII: Hearing is bilaterally intact.   XII: Tongue protrudes midline  Motor: Normal bulk and tone. No pronator drift. Strength LUE 5/5, RUE 4/5 when the hands were elevated, the right extremity had a slight drift. It stayed midair, never hit the bed. Bilateral lower extremities 5/5.  Sensation: Intact to light touch bilaterally. No neglect or extinction on double simultaneous testing. Finds left arm to "feel" more than right. Describes right as colder.  Coordination: No dysmetria on finger-to-nose     Pt arrived to CT scan at 5pm. CTH, CTA Head and Neck, and CT Perfusion were done.     Results are as follows:     IMPRESSION:    CT PERFUSION:    Small left frontoparietal Tmax elevation site persists, although smaller   than previously reported. No large/territorial deficit.    CTA INTRACRANIAL:    Resolution of left MCA distal M2 or proximal M3 branch occlusion compared   to earlier same day study, now appearing patent.    No additional or new steno-occlusive focus to explain symptoms. MRI/MRA   is pending.    CTA EXTRACRANIAL:    No arterial steno-occlusive disease or evidence of dissection. Nurse alerted Stroke Team of change in speech; patient become aphasic. Stroke code activated at 4:47pm. NP performed bedside neuro assessment and is as follows:     Neurologic:  -Mental status: Awake, alert, oriented to person, place, and time. Speech is intact naming, repetition, and comprehension, w/ significant audible new aphasia. Pt explains that it is hard for his tongue to move with his speech. Recent and remote memory intact. Follows commands. Attention/concentration intact. Fund of knowledge appropriate.  -Cranial nerves:   II: Visual fields are full to confrontation.  III, IV, VI: Extraocular movements are intact without nystagmus. Pupils equally round and reactive to light  V:  Pt reports tingling and numbness on left side of the face.   VII: Face is asymmetric with new moderate R facial droop (prior to stroke code it was mild).   VIII: Hearing is bilaterally intact.   XII: Tongue protrudes midline  Motor: Normal bulk and tone. No pronator drift. Strength LUE 5/5, RUE 4/5 when the hands were elevated, the right extremity had a slight drift. It stayed midair, never hit the bed. Bilateral lower extremities 5/5.  Sensation: Intact to light touch bilaterally. No neglect or extinction on double simultaneous testing. Finds left arm to "feel" more than right. Describes right as colder.  Coordination: No dysmetria on finger-to-nose     Pt arrived to CT scan at 5pm. CTH, CTA Head and Neck, and CT Perfusion were done.     Results are as follows:     CTA/CT Perfusion     IMPRESSION:    CT PERFUSION:    Small left frontoparietal Tmax elevation site persists, although smaller   than previously reported. No large/territorial deficit.    CTA INTRACRANIAL:    Resolution of left MCA distal M2 or proximal M3 branch occlusion compared   to earlier same day study, now appearing patent.    No additional or new steno-occlusive focus to explain symptoms. MRI/MRA   is pending.    CTA EXTRACRANIAL:    No arterial steno-occlusive disease or evidence of dissection.    CTH Results   No acute intracranial hemorrhage, mass effect or demarcated infarct. No   change since earlier same day.      Plan:    Active Thrombectomy Watch   250cc NS bolus (possibly pressure dependent) Nurse alerted Stroke Team of change in speech; patient become aphasic. Stroke code activated at 4:47pm. NP performed bedside neuro assessment and is as follows:     Neurologic:  -Mental status: Awake, alert, oriented to person, place, and time. Speech is intact naming, did have some word finding difficulty. +dysarthria. Recent and remote memory intact. Follows cross commands.    -Cranial nerves:   II: Visual fields are full to confrontation.  III, IV, VI: Extraocular movements are intact without nystagmus. Pupils equally round and reactive to light  V:  Pt reports tingling and numbness on left side of the face.   VII: Face is asymmetric with new moderate R facial droop (prior to stroke code it was mild).   VIII: Hearing is bilaterally intact.   XII: Tongue protrudes midline  Motor: Normal bulk and tone. No pronator drift. Strength LUE 5/5, RUE 4/5 when the hands were elevated, the right extremity had a slight drift. It stayed midair, never hit the bed. Bilateral lower extremities 5/5.  Sensation: Intact to light touch bilaterally. No neglect or extinction on double simultaneous testing. Finds left arm to "feel" more than right. Describes right as colder.  Coordination: No dysmetria on finger-to-nose     NIHSS 1-->5    Pt arrived to CT scan at 5pm. CTH, CTA Head and Neck, and CT Perfusion were done.     Results are as follows:     CTA/CT Perfusion     IMPRESSION:    CT PERFUSION:    Small left frontoparietal Tmax elevation site persists, although smaller   than previously reported. No large/territorial deficit.    CTA INTRACRANIAL:    Resolution of left MCA distal M2 or proximal M3 branch occlusion compared   to earlier same day study, now appearing patent.    No additional or new steno-occlusive focus to explain symptoms. MRI/MRA   is pending.    CTA EXTRACRANIAL:    No arterial steno-occlusive disease or evidence of dissection.    CTH Results   No acute intracranial hemorrhage, mass effect or demarcated infarct. No   change since earlier same day.      Plan:    Active Thrombectomy Watch   250cc NS bolus (possibly pressure dependent)

## 2025-01-27 NOTE — H&P ADULT - ASSESSMENT
70yo former smoker, with PMHx TIA in 2012 (presented with dysarthria, RUE numbness, noncompliant on aspirin and allergic to Lipitor), HTN, pulmonary sarcoidosis, "leaky valve", presenting to [facility] ED on 01/27/25 with c/o R hand weakness and dysphagia. LKW at 04:15AM. States waking up and trying to make breakfast when suddenly realizing he couldn't use his hand to cut hossein, was drooling from the middle of his mouth and had issues swallowing to the point that he could not take his daily Norvasc. These symptoms lasted for about 30 minutes and resolved. On his way to the ED, started experiencing R foot numbness, which lasted about 5-10 minutes, which also resolved on its own. On arrival, SBP was 185/104. Patient started complaining of L sided facial numbness from V1-V3, most prominent on L V1 distribution and of "L hand being very cold". Of note, patient recently finished course of z-pack and prednisone for "lung infection". CTH negative, pending final read for CTA and CTP, however with notable perfusion mismatch on L parietal lobe. Admitted to stroke tele for TIA work-up    Neuro  #CVA workup  - continue aspirin 81mg and plavix 75mg daily  - holding atorvastatin - patient allergic  - consider starting zetia  - q4hr stroke neuro checks and vitals  - obtain MRI Brain without contrast  - Stroke Code HCT Results: negative  - Stroke Code CTA Results: pending final read  - Stroke education    Cards  #HTN  - Goal -180  - hold home blood pressure medication for now  - obtain TTE with bubble      #HLD  - high dose statin as above in CVA  - LDL results: pending    Pulm  - call provider if SPO2 < 94%    GI  #Nutrition/Fluids/Electrolytes   - replete K<4 and Mg <2  - Diet: DASH  - IVF: maintenance fluids 50cc/h    Renal  - daily bmp    Infectious Disease  - afebrile on admission    Endocrine  #DM  - A1C results: pending  - ISS    - TSH results: pending    DVT Prophylaxis  - lovenox sq for DVT prophylaxis   - SCDs for DVT prophylaxis      Dispo:pending PT/OT eval      Discussed with Dr. Reed

## 2025-01-27 NOTE — ED PROVIDER NOTE - NS ED MD DISPO DIVISION
CHIEF COMPLAINT:    Chief Complaint   Patient presents with    Other     Feeding issue, crying after taking bottle     PCP:  Matteo Heredia MD    SUBJECTIVE:    PPE worn by provider - N-95 mask.    Hector is a 3 month old male here today with parents, fussiness for Ladi and Jose Alejandro.  Child has spit up small amount since birth, has not previously bothered him.  Did have a larger, chunky spit up in clinic today.  For the past week, he has been very fussy, crying after feedings.  He is taking Similac Total Comfort.  Initially, was taking 5 oz, but last week, appetite seemed down and was only taking 1-2 oz.  Feeding volume has improved and he is now taking 4-1/2 oz every 3-1/2 hours.  Parents are holding him upright for 60 minutes after feeding.  He seems to cry unless he is eating or being held upright.  Bowel movements were initially watery (mom thought related to a medication she was taking), but consistency has gotten a little thicker now, stools are dark green and 1-2 per day.  Parents have not noted an abnormal temperature.  No runny nose.  Occasional, mild cough.  They would like me to check to see if he has a flat spot on his head.  Parents have tried mylicon drops which seem to help for about an hour.    Birth History    Birth     Length: 20\" (50.8 cm)     Weight: 2.892 kg (6 lb 6 oz)    [facility]ospital [facility]: [facility]       Past Surgical History:   Procedure Laterality Date    Circumcision clamp w reg block penile ring N/A        Patient Active Problem List   Diagnosis    Maternal drug abuse, antepartum  (CMD)    Maternal hepatitis C, chronic, antepartum  (CMD)       No outpatient medications have been marked as taking for the 9/12/24 encounter (Office Visit) with Evelin Fraser MD.       has No Known Allergies.     PHYSICAL EXAM:  Visit Vitals  Pulse 136   Temp 97.8 °F (36.6 °C) (Tympanic)   Resp 40   Wt 5.847 kg (12 lb 14.2 oz)     Infant has gained an average of 34 g/day since his last  visit.  GENERAL:  The baby is alert and in no distress.  He is restless and constantly moving when supine on the exam table.  HEENT:  The fontanelle is open, soft and flat.  There is mild right plagiocephaly without decreased range of motion of his neck.  Conjunctivae are without erythema or discharge.  The ear canals are normal bilaterally.  Left tympanic membrane normal.  Right tympanic membrane normal.  The nares are patent.  There is no nasal congestion or discharge. The oropharynx is clear with no thrush.  No teeth.  NECK:  Neck is supple.    LUNGS:  Lungs are clear to auscultation, there is good air exchange with no increased work of breathing.      CARDIOVASCULAR:  The heart is regular rate and rhythm .  There is no murmur.    ABDOMEN:  The abdomen has normal bowel sounds, is soft and nontender, no hepatosplenomegaly or masses.   SKIN:  No rashes.    ASSESSMENT:  Fussy infant.  Excellent weight gain.  Suspect DIANNA.    PLAN:  Discussed reflux precautions at length with parents.  Given that they have decreased his volume a bit and are keeping him upright after feedings, a trial of famotidine is reasonable.  Discussed that this can be helpful, but is not always.  If no improvement in symptoms after one week, to discontinue the famotidine.  Parents had several other baby care questions that were addressed during the visit.  Handout on plagiocephaly given.      Today, a total of at least 46 minutes were spent during this encounter reviewing previous records (visits and studies), obtaining pertinent history and exam findings listed above, discussing/counseling about my medical decision making, discussing with other care providers if necessary, and subsequently documenting in the electronic medical record.         [facility]

## 2025-01-27 NOTE — PHYSICAL THERAPY INITIAL EVALUATION ADULT - PERTINENT HX OF CURRENT PROBLEM, REHAB EVAL
68yo former smoker, with PMHx TIA in 2012 (presented with dysarthria, RUE numbness, noncompliant on aspirin and allergic to Lipitor), HTN, pulmonary sarcoidosis, "leaky valve", presenting to [facility] ED on 01/27/25 with c/o R hand weakness and dysphagia. LKW at 04:15AM. States waking up and trying to make breakfast when suddenly realizing he couldn't use his hand to cut hossein, was drooling from the middle of his mouth and had issues swallowing to the point that he could not take his daily Norvasc. These symptoms lasted for about 30 minutes and resolved. On his way to the ED, started experiencing R foot numbness, which lasted about 5-10 minutes, which also resolved on its own. On arrival, SBP was 185/104. Patient started complaining of L sided facial numbness from V1-V3, most prominent on L V1 distribution and of "L hand being very cold". Of note, patient recently finished course of z-pack and prednisone for "lung infection". CTH negative, pending final read for CTA and CTP, however with notable perfusion mismatch on L parietal lobe. Admitted to stroke tele for TIA work-up

## 2025-01-27 NOTE — PROGRESS NOTE ADULT - ASSESSMENT
Assessment  70yo former smoker, with PMHx TIA in 2012 (presented with dysarthria, RUE numbness, noncompliant on aspirin and allergic to Lipitor), HTN, pulmonary sarcoidosis, "leaky valve", presenting to [facility] ED on 01/27/25 with c/o R hand weakness and dysphagia. LKW at 04:15AM. States waking up and trying to make breakfast when suddenly realized he couldn't use his hand to cut hossein, was drooling from the middle of his mouth and had issues swallowing to the point that he could not take his daily Norvasc. Symptoms lasted for about 30 minutes and resolved. On his way to the ED, started experiencing R foot numbness, which lasted about 5-10 minutes, which also resolved on its own. On arrival, Patient started complaining of L sided facial numbness from V1-V3, most prominent on L V1 distribution and of "L hand being very cold". Patient recently finished course of z-pack and prednisone for "lung infection". Pt admitted to stroke tele on thrombectomy watch. 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, now pending full stroke workup     Neuro  #CVA workup  - Loaded w/ aspirin 162mg and 300mg of plavix once 1/27  - continue aspirin 81mg and plavix 75mg daily  - Hold statin- pt allergic, Zetia 10mg PO daily started instead   - q2hr stroke neuro checks and q4hr vitals  - obtain MRI Brain without contrast  - Stroke Code HCT Results: neg  - Stroke Code CTA Results: distal m2 occlusion   - Stroke education    Cards  #HTN  - Goal -200  - hold home blood pressure medication for now  - Hold TTE until tmrw 1/27 , on strict bed rest   - Stroke Code EKG Results: T wave abnormality, consider inferolateral ischemia  Prolonged QT    #HLD  - Statin started as stated above   - LDL results: pending    Pulm  - call provider if SPO2 < 94%    GI  #Nutrition/Fluids/Electrolytes   - replete K<4 and Mg <2  - Diet: Regular   - IVF: 75ml/hr. 250cc bolus given to maintain higher pressures    Renal  -     Infectious Disease  - Stroke Code CXR results: none    Endocrine  - A1C results: pending    - TSH results: pending    DVT Prophylaxis  - lovenox sq for DVT prophylaxis   - SCDs for DVT prophylaxis     Pt discussed during rounds with Dr. Feliciano COBURN Goals: Goals reviewed at interdisciplinary rounds with case management, social work, physical therapy, occupational therapy, and speech language pathology.   Please see specific therapy  notes for in depth goals.  Dispo: **********(Acute rehab - can tolerate 3 hours of therapy)     Discussed daily hospital plans and goals with patient and family at bedside. (Called and updated family.) Assessment  68yo former smoker, with PMHx TIA in 2012 (presented with dysarthria, RUE numbness, noncompliant on aspirin and allergic to Lipitor), HTN, pulmonary sarcoidosis, "leaky valve", presenting to [facility] ED on 01/27/25 with c/o R hand weakness and dysphagia. LKW at 04:15AM. States waking up and trying to make breakfast when suddenly realized he couldn't use his hand to cut hossein, was drooling from the middle of his mouth and had issues swallowing to the point that he could not take his daily Norvasc. Symptoms lasted for about 30 minutes and resolved. On his way to the ED, started experiencing R foot numbness, which lasted about 5-10 minutes, which also resolved on its own. On arrival, Patient started complaining of L sided facial numbness from V1-V3, most prominent on L V1 distribution and of "L hand being very cold". Patient recently finished course of z-pack and prednisone for "lung infection". Pt admitted to stroke tele on thrombectomy watch. 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, now pending full stroke workup     Neuro  #CVA workup  - Loaded w/ aspirin 162mg and 300mg of plavix once 1/27  - continue aspirin 81mg and plavix 75mg daily  - Hold statin- pt allergic, Zetia 10mg PO daily started instead   - q2hr stroke neuro checks and q2hr vitals  - obtain MRI Brain without contrast, NOVA, SPECT for bypass w/u  - Stroke Code HCT Results: neg  - Stroke Code CTA Results: distal m2 occlusion   - Stroke education    Cards  #HTN  - Goal -200  - hold home blood pressure medication for now  - Hold TTE until tmrw 1/28 , on strict bed rest   - hold ADDIE as do not want to drop pressure  - Stroke Code EKG Results: T wave abnormality, consider inferolateral ischemia Prolonged QT    #HLD  - Statin started as stated above   - LDL results: pending    Pulm  - call provider if SPO2 < 94%    GI  #Nutrition/Fluids/Electrolytes   - replete K<4 and Mg <2  - Diet: Regular   - IVF: 75ml/hr. 250cc bolus given to maintain higher pressures    Renal  - trend BMP    Infectious Disease  - delroy    Endocrine  - A1C results: pending    - TSH results: pending    DVT Prophylaxis  - lovenox sq for DVT prophylaxis   - SCDs for DVT prophylaxis     Pt discussed during rounds with Dr. Feliciano Hinton: no needs     Discussed daily hospital plans and goals with patient

## 2025-01-27 NOTE — CONSULT NOTE ADULT - SUBJECTIVE AND OBJECTIVE BOX
Admission H&P:  HPI:  70yo former smoker, with PMHx TIA in 2012 (presented with dysarthria, RUE numbness, noncompliant on aspirin and allergic to Lipitor), HTN, pulmonary sarcoidosis, "leaky valve", presenting to [facility] ED on 01/27/25 with c/o R hand weakness and dysphagia. LKW at 04:15AM. States waking up and trying to make breakfast when suddenly realizing he couldn't use his hand to cut hossein, was drooling from the middle of his mouth and had issues swallowing to the point that he could not take his daily Norvasc. These symptoms lasted for about 30 minutes and resolved. On his way to the ED, started experiencing R foot numbness, which lasted about 5-10 minutes, which also resolved on its own. On arrival, SBP was 185/104. Patient started complaining of L sided facial numbness from V1-V3, most prominent on L V1 distribution and of "L hand being very cold". Of note, patient recently finished course of z-pack and prednisone for "lung infection". Currently denies any dysarthria, dysphagia, facial droop, dizziness, vision changes or extremity weakness/numbness.    -----------------------------------------------------------------------------------------------------------------  IV-thrombolytic (Y/N):  No                              Reason thrombolytic not given: Low NIHSS score (1). Initial symptoms resolved by time of arrival   (27 Jan 2025 06:07)    INTERVAL HISTORY:  - endorsing that R hand numbness has resolved but now having L face and L arm numbness  - otherwise no headache or vision changes  - having a cough that's unchanged from prior  - recently finished Z pack and steroids per pulm for sarcoidosis  - no other organ involvement that he knows of, other than pulmonary  - previously followed at [facility] for sarcoid but recently switched to [facility] and seeing Dr. Vazquez  - no fever, chest pain, shortness of breath    PAST MEDICAL & SURGICAL HISTORY:  Pulmonary sarcoidosis  TIA (transient ischemic attack)  Hypertension  Knee meniscus pain, left  1980 left knee meniscus repair  History of facial surgery  1978 right cheek bone  History of shoulder surgery  2000 right shoulder bone spur    Home Medications:  Norvasc 5 mg oral tablet: 1 tab(s) orally once a day (27 Jan 2025 06:24)    Allergies    No Known Allergies    Intolerances      FAMILY HISTORY:  Family history of lung cancer (Father)    Family history of diabetes mellitus (Mother)      Social History:      REVIEW OF SYSTEMS:  RESPIRATORY: No cough, No dyspnea  CARDIOVASCULAR: No chest pain, or leg swelling  GASTROINTESTINAL: no diarrhea  GENITOURINARY: No dysuria,   NEUROLOGICAL: No numbness, or tremors  ALLERGY AND IMMUNOLOGIC: No hives or eczema    Diet, DASH/TLC:   Sodium & Cholesterol Restricted (01-27-25 @ 06:45) [Active]      CURRENT MEDICATIONS:   aspirin enteric coated 81 milliGRAM(s) Oral daily  enoxaparin Injectable 40 milliGRAM(s) SubCutaneous every 24 hours  ezetimibe 10 milliGRAM(s) Oral daily  influenza  Vaccine (HIGH DOSE) 0.5 milliLiter(s) IntraMuscular once  sodium chloride 0.9%. 1000 milliLiter(s) IV Continuous <Continuous>      VITAL SIGNS, INS/OUTS (last 24 hours):  Vital Signs Last 24 Hrs  T(C): 36.9 (27 Jan 2025 14:11), Max: 36.9 (27 Jan 2025 14:11)  T(F): 98.5 (27 Jan 2025 14:11), Max: 98.5 (27 Jan 2025 14:11)  HR: 73 (27 Jan 2025 13:54) (66 - 86)  BP: 145/74 (27 Jan 2025 13:54) (142/73 - 209/97)  BP(mean): 104 (27 Jan 2025 13:54) (101 - 104)  RR: 17 (27 Jan 2025 12:46) (17 - 18)  SpO2: 99% (27 Jan 2025 12:46) (97% - 99%)    Parameters below as of 27 Jan 2025 12:46  Patient On (Oxygen Delivery Method): room air      I&O's Summary    27 Jan 2025 07:01  -  27 Jan 2025 16:18  --------------------------------------------------------  IN: 550 mL / OUT: 2040 mL / NET: -1490 mL    PHYSICAL EXAM:  Gen: Reclining in bed at time of exam, appears stated age, INAD  HEENT: NCAT, MMM  CV: RRR, +S1/S2  Pulm: adequate respiratory effort, no increase in work of breathing on room air, CTAB, diminished air movement bilaterally  Abd: soft, ND  Skin: warm and dry,  Ext: wwp, no edema  Neuro: AOx3, speaking in full sentences   Psych: affect and behavior appropriate, pleasant at time of interview    BASIC LABS:                        12.9   4.26  )-----------( 166      ( 27 Jan 2025 05:36 )             41.0     01-27    137  |  99  |  10  ----------------------------<  128[H]  4.3   |  28  |  0.88    Ca    8.2[L]      27 Jan 2025 05:36    TPro  7.2  /  Alb  3.9  /  TBili  0.3  /  DBili  x   /  AST  24  /  ALT  14  /  AlkPhos  88  01-27    PT/INR - ( 27 Jan 2025 05:36 )   PT: 11.5 sec;   INR: 1.00          PTT - ( 27 Jan 2025 05:36 )  PTT:32.3 sec  Urinalysis Basic - ( 27 Jan 2025 05:36 )    Color: x / Appearance: x / SG: x / pH: x  Gluc: 128 mg/dL / Ketone: x  / Bili: x / Urobili: x   Blood: x / Protein: x / Nitrite: x   Leuk Esterase: x / RBC: x / WBC x   Sq Epi: x / Non Sq Epi: x / Bacteria: x      CAPILLARY BLOOD GLUCOSE  132 (27 Jan 2025 06:32)      POCT Blood Glucose.: 132 mg/dL (27 Jan 2025 05:37)      EKG: TWI in II, III, aVF, V4, V5 which have been previously noted    Normal stress echo in 7/2024   1. Normal exercise stress echocardiogram.   2. No evidence of exercise induced ischemia.   3. Normal exercise electrocardiography.   4. No ECG evidence of exercise ischemia at or near maximal predicted heart rate.   5. Negative for angina or the patient's characteristic chest pain.   6. Physiologic blood pressure response to exercise.   7. No exercise-induced arrhythmia.   8. Good functional capacity.   9. Mildly dilated aortic root.    Overall left ventricular contractility increased with exercise. Segmental   wall motion abnormalities were not seen. With stress, normal augmentation   of all left ventricular wall segments noted, end systolic volume   decreased and the ejection fraction increased. With stress, the left   ventricular ejection fraction was 65%. No evidence of stress induced wall   motion abnormalities. The echocardiogram is negative for ischemia.    #Diet - Diet, DASH/TLC:   Sodium & Cholesterol Restricted (01-27-25 @ 06:45) [Active]    #DVT PPx - enoxaparin Injectable: [Known as LOVENOX]  40 milliGRAM(s), SubCutaneous, every 24 hours  Special Instructions: For patients "At Risk" for DVT/PE administer for duration of hospital stay  Administration Instructions: This is a High Alert Medication. (01-27-25 @ 06:30)

## 2025-01-27 NOTE — PROGRESS NOTE ADULT - SUBJECTIVE AND OBJECTIVE BOX
Neurology Stroke Progress Note    INTERVAL HPI/OVERNIGHT EVENTS:  Patient seen and examined.  number none used. Patient reports no pain, but mild tingling and numbness on left side of face and left arm. Pt states that right hand feels better, stronger, than when he had woken with, symptoms began at 4 am.     MEDICATIONS  (STANDING):  aspirin enteric coated 81 milliGRAM(s) Oral daily  enoxaparin Injectable 40 milliGRAM(s) SubCutaneous every 24 hours  ezetimibe 10 milliGRAM(s) Oral daily  influenza  Vaccine (HIGH DOSE) 0.5 milliLiter(s) IntraMuscular once  sodium chloride 0.9%. 1000 milliLiter(s) (75 mL/Hr) IV Continuous <Continuous>    MEDICATIONS  (PRN):    Allergies    No Known Allergies    Intolerances      Vital Signs Last 24 Hrs  T(C): 36.9 (27 Jan 2025 14:11), Max: 36.9 (27 Jan 2025 14:11)  T(F): 98.5 (27 Jan 2025 14:11), Max: 98.5 (27 Jan 2025 14:11)  HR: 73 (27 Jan 2025 13:54) (66 - 86)  BP: 145/74 (27 Jan 2025 13:54) (142/73 - 209/97)  BP(mean): 104 (27 Jan 2025 13:54) (101 - 104)  RR: 17 (27 Jan 2025 12:46) (17 - 18)  SpO2: 99% (27 Jan 2025 12:46) (97% - 99%)    Parameters below as of 27 Jan 2025 12:46  Patient On (Oxygen Delivery Method): room air    Physical exam:  General: No acute distress, awake and alert  Eyes: Anicteric sclerae, moist conjunctivae, see below for CNs  Neck: trachea midline  Cardiovascular: Regular rate and rhythm  Pulmonary:  No use of accessory muscles    Neurologic:  -Mental status: Awake, alert, oriented to person, place, and time. Speech is fluent with intact naming, repetition, and comprehension, no dysarthria. Recent and remote memory intact. Follows commands. Attention/concentration intact. Fund of knowledge appropriate.  -Cranial nerves:   II: Visual fields are full to confrontation.  III, IV, VI: Extraocular movements are intact without nystagmus. Pupils equally round and reactive to light  V:  Pt reports tingling and numbness on left side of the face.   VII: Face is asymmetric with mild R facial droop.   VIII: Hearing is bilaterally intact.   XII: Tongue protrudes midline  Motor: Normal bulk and tone. No pronator drift. Strength LUE 5/5, RUE 4/5 when the hands were elevated, the right extremity had a slight drift. It stayed midair, never hit the bed. Bilateral lower extremities 5/5.  Sensation: Intact to light touch bilaterally. No neglect or extinction on double simultaneous testing. Finds left arm to "feel" more than right. Describes right as colder.  Coordination: No dysmetria on finger-to-nose     LABS:                        12.9   4.26  )-----------( 166      ( 27 Jan 2025 05:36 )             41.0     01-27    137  |  99  |  10  ----------------------------<  128[H]  4.3   |  28  |  0.88    Ca    8.2[L]      27 Jan 2025 05:36    TPro  7.2  /  Alb  3.9  /  TBili  0.3  /  DBili  x   /  AST  24  /  ALT  14  /  AlkPhos  88  01-27    PT/INR - ( 27 Jan 2025 05:36 )   PT: 11.5 sec;   INR: 1.00          PTT - ( 27 Jan 2025 05:36 )  PTT:32.3 sec  Urinalysis Basic - ( 27 Jan 2025 05:36 )    Color: x / Appearance: x / SG: x / pH: x  Gluc: 128 mg/dL / Ketone: x  / Bili: x / Urobili: x   Blood: x / Protein: x / Nitrite: x   Leuk Esterase: x / RBC: x / WBC x   Sq Epi: x / Non Sq Epi: x / Bacteria: x        RADIOLOGY & ADDITIONAL TESTS:    1/27/25 CT Head     CT HEAD:  No acute intracranial hemorrhage or demarcated transcortical infarction.  If symptoms persist, consider MRI for further assessment if not   contraindicated.    1/27/2025    CTA NECK:    AORTIC ARCH AND VISUALIZED GREAT VESSELS: Within normal limits.    RIGHT:  COMMON CAROTID ARTERY: No significant stenosis to the carotid bifurcation.  INTERNAL CAROTID ARTERY: No significant stenosis based on NASCET criteria.  VERTEBRAL ARTERY: Normal in course andcaliber to the intracranial   circulation.    LEFT:  COMMON CAROTID ARTERY: No significant stenosis to the carotid bifurcation.  INTERNAL CAROTID ARTERY: No significant stenosis based on NASCET criteria.  VERTEBRAL ARTERY: Normal in course and caliber to the intracranial   circulation.    VISUALIZED LUNGS: Paraseptal emphysema and areas of scarring and   bronchiectasis of the lung apices. Hypoattenuating 12 mm left thyroid   nodule.    MISCELLANEOUS: None.    CAROTID STENOSIS REFERENCE: Percent (%) stenosis is expressed in terms of   NASCET Criteria. (NASCET = 100x1-(N/D)). N=greatest narrowing. D=normal   distal diameter - MILD = <50% stenosis. - MODERATE = 50-69% stenosis. -   SEVERE = 70-89% stenosis. - HAIRLINE/CRITICAL = 90-99% stenosis.-   OCCLUDED = 100% stenosis.      CTA HEAD:  Mild venous contamination.    INTERNAL CAROTID ARTERIES: Bilateral petrous, precavernous, cavernous,   and supraclinoid regions are patent without significant stenosis. Minimal   atherosclerotic calcification left cavernous segment.    [place]: No aneurysm identified. Tiny aneurysms can be beyond   the resolution of CTA technique.    ANTERIOR CEREBRAL ARTERIES: No significant stenosis or occlusion.  MIDDLE CEREBRAL ARTERIES: Occlusion of a distal left M2 branch in the   posterior sylvian fissure (10:35, 6:239). Otherwise no significant   stenosis or occlusion.  POSTERIOR CEREBRAL ARTERIES: No significant stenosis or occlusion.    DISTAL VERTEBRAL / BASILAR ARTERIES: No significant stenosis or occlusion.    VENOUS STRUCTURES: Visualized superficial and deep venous structures   appear patent. The right sigmoid sinus primarily drains into the   prominent right vertebral venous plexus, and the right internal jugular   vein is well-visualized.    MISCELLANEOUS: No other vascular abnormality is seen.

## 2025-01-27 NOTE — PATIENT PROFILE ADULT - FALL HARM RISK - HARM RISK INTERVENTIONS

## 2025-01-27 NOTE — PROVIDER CONTACT NOTE (CHANGE IN STATUS NOTIFICATION) - ASSESSMENT
Pt A/Ox4, follows commands and VAN. NIH of 4. Previous NIH at 14:45 was 2. Pt has no new strength or coordination deficits. Pt states he feels that he has to actively use his brain to get his tongue to form his words properly. Pt also displayed mild aphasia. Pt A/Ox4, follows commands and VAN. NIH of 4. Previous NIH at 14:45 was 2. Pt has no new strength or coordination deficits. Pt states he feels that he has to actively use his brain to get his tongue to form his words properly. Pt also displayed mild aphasia. Fingerstick 109.

## 2025-01-27 NOTE — ED PROVIDER NOTE - CRITICAL CARE ATTENDING CONTRIBUTION TO CARE
critical Care Procedure Note  Authorized and Performed by:   DO RYDER Alvarado  Total critical care time: Approximately 36 minutes  Due to a high probability of clinically significant, life threatening deterioration, the patient required my highest level of preparedness to intervene emergently and I personally spent this critical care time directly and personally managing the patient. This critical care time included obtaining a history; examining the patient; pulse oximetry; ordering and review of studies; arranging urgent treatment with development of a management plan; evaluation of patient's response to treatment; frequent reassessment; and, discussions with other providers.  This critical care time was performed to assess and manage the high probability of imminent, life-threatening deterioration that could result in multi-organ failure. It was exclusive of separately billable procedures and treating other patients and teaching time.  Please see MDM section and the rest of the note for further information on patient assessment and treatment.

## 2025-01-27 NOTE — STROKE CODE NOTE - NIH STROKE SCALE: 9. BEST LANGUAGE, QM
(1) Mild-to-moderate aphasia; some obvious loss of fluency or facility of comprehension, without significant limitation on ideas expressed or form of expression. Reduction of speech and/or comprehension, however, makes conversation about provided material difficult or impossible. For example, in conversation about provided materials, examiner can identify picture or naming card content from patient's response.
(0) No aphasia; normal

## 2025-01-27 NOTE — OCCUPATIONAL THERAPY INITIAL EVALUATION ADULT - PERTINENT HX OF CURRENT PROBLEM, REHAB EVAL
68yo former smoker, with PMHx TIA in 2012 (presented with dysarthria, RUE numbness, noncompliant on aspirin and allergic to Lipitor), HTN, pulmonary sarcoidosis, "leaky valve", presenting to [facility] ED on 01/27/25 with c/o R hand weakness and dysphagia. LKW at 04:15AM. States waking up and trying to make breakfast when suddenly realizing he couldn't use his hand to cut hossein, was drooling from the middle of his mouth and had issues swallowing to the point that he could not take his daily Norvasc. These symptoms lasted for about 30 minutes and resolved. On his way to the ED, started experiencing R foot numbness, which lasted about 5-10 minutes, which also resolved on its own. On arrival, SBP was 185/104. Patient started complaining of L sided facial numbness from V1-V3, most prominent on L V1 distribution and of "L hand being very cold". Of note, patient recently finished course of z-pack and prednisone for "lung infection". CTH negative, pending final read for CTA and CTP, however with notable perfusion mismatch on L parietal lobe. Admitted to stroke tele for TIA work-up

## 2025-01-27 NOTE — H&P ADULT - NSHPPHYSICALEXAM_GEN_ALL_CORE
Physical exam:  Constitutional: No acute distress, conversant  Eyes: Anicteric sclerae, moist conjunctivae, see below for CNs  Neck: trachea midline, FROM, supple, no thyromegaly or lymphadenopathy  Cardiovascular: Regular rate and rhythm  Pulmonary:  No use of accessory muscles  Extremities:  no edema    Neurologic:  -Mental status: Awake, alert, oriented to person, age, and month. Speech is fluent with intact naming, repetition, and comprehension, no dysarthria. Follows 2-step and cross commands.   -Cranial nerves:   II: Visual fields are full to confrontation.  III, IV, VI: Extraocular movements are intact without nystagmus. Pupils equally round and reactive to light  V:  Decreased sensation on L V1-V3 distribution, most prominent on L V1  VII: Face is symmetric with normal eye closure and smile  VIII: Hearing is bilaterally intact to finger rub  XII: Tongue protrudes midline  Motor: Normal bulk and tone. No pronator drift. Symmetrical 5/5  strength. Strength bilateral upper extremity 5/5, bilateral lower extremities 5/5.  Sensation: Intact to light touch bilaterally. No neglect or extinction on double simultaneous testing.  Coordination: No dysmetria on finger-to-nose and heel-to-shin bilaterally  Gait: Narrow gait and steady    NIHSS: 1

## 2025-01-27 NOTE — ED PROVIDER NOTE - PROGRESS NOTE DETAILS
all symptoms have resolved NIH stroke scale now 0 will admit to stroke telemetry CT head grossly negative for intracranial hemorrhage

## 2025-01-27 NOTE — H&P ADULT - NSICDXFAMILYHX_GEN_ALL_CORE_FT
FAMILY HISTORY:  Father  Still living? Unknown  Family history of lung cancer, Age at diagnosis: Age Unknown    Mother  Still living? Unknown  Family history of diabetes mellitus, Age at diagnosis: Age Unknown

## 2025-01-27 NOTE — ED PROVIDER NOTE - OBJECTIVE STATEMENT
68 yo male w/ pmhx CVA 2012 wo residual deficits htn on amlodipine  here today with wake-up stroke at 415.  Patient went to sleep woke up at 3 AM began to feel right hand weakness was unable to  lemon squeeze there was drooling unable to swallow spit out his amlodipine felt left-sided facial numbness and felt like the face was pulling towards the left.  States that symptoms have since improved.  No longer has right hand weakness.  Denies associated chest pain shortness of breath hematochezia melena abdominal pain nausea vomiting or diarrhea.  States that swallowing is improved as well.  Now able to swallow his own secretions.

## 2025-01-28 LAB
A1C WITH ESTIMATED AVERAGE GLUCOSE RESULT: 5.6 % — SIGNIFICANT CHANGE UP (ref 4–5.6)
ALBUMIN SERPL ELPH-MCNC: 3.6 G/DL — SIGNIFICANT CHANGE UP (ref 3.3–5)
ALP SERPL-CCNC: 78 U/L — SIGNIFICANT CHANGE UP (ref 40–120)
ALT FLD-CCNC: 11 U/L — SIGNIFICANT CHANGE UP (ref 10–45)
ANION GAP SERPL CALC-SCNC: 8 MMOL/L — SIGNIFICANT CHANGE UP (ref 5–17)
ANION GAP SERPL CALC-SCNC: 8 MMOL/L — SIGNIFICANT CHANGE UP (ref 5–17)
AST SERPL-CCNC: 21 U/L — SIGNIFICANT CHANGE UP (ref 10–40)
BASOPHILS # BLD AUTO: 0.04 K/UL — SIGNIFICANT CHANGE UP (ref 0–0.2)
BASOPHILS NFR BLD AUTO: 0.9 % — SIGNIFICANT CHANGE UP (ref 0–2)
BILIRUB SERPL-MCNC: 0.4 MG/DL — SIGNIFICANT CHANGE UP (ref 0.2–1.2)
BUN SERPL-MCNC: 10 MG/DL — SIGNIFICANT CHANGE UP (ref 7–23)
BUN SERPL-MCNC: 9 MG/DL — SIGNIFICANT CHANGE UP (ref 7–23)
CALCIUM SERPL-MCNC: 8 MG/DL — LOW (ref 8.4–10.5)
CALCIUM SERPL-MCNC: 8 MG/DL — LOW (ref 8.4–10.5)
CHLORIDE SERPL-SCNC: 103 MMOL/L — SIGNIFICANT CHANGE UP (ref 96–108)
CHLORIDE SERPL-SCNC: 104 MMOL/L — SIGNIFICANT CHANGE UP (ref 96–108)
CHOLEST SERPL-MCNC: 144 MG/DL — SIGNIFICANT CHANGE UP
CO2 SERPL-SCNC: 28 MMOL/L — SIGNIFICANT CHANGE UP (ref 22–31)
CO2 SERPL-SCNC: 29 MMOL/L — SIGNIFICANT CHANGE UP (ref 22–31)
CREAT SERPL-MCNC: 0.85 MG/DL — SIGNIFICANT CHANGE UP (ref 0.5–1.3)
CREAT SERPL-MCNC: 0.86 MG/DL — SIGNIFICANT CHANGE UP (ref 0.5–1.3)
EGFR: 94 ML/MIN/1.73M2 — SIGNIFICANT CHANGE UP
EGFR: 94 ML/MIN/1.73M2 — SIGNIFICANT CHANGE UP
EOSINOPHIL # BLD AUTO: 0.1 K/UL — SIGNIFICANT CHANGE UP (ref 0–0.5)
EOSINOPHIL NFR BLD AUTO: 2.3 % — SIGNIFICANT CHANGE UP (ref 0–6)
ESTIMATED AVERAGE GLUCOSE: 114 MG/DL — SIGNIFICANT CHANGE UP (ref 68–114)
GLUCOSE SERPL-MCNC: 90 MG/DL — SIGNIFICANT CHANGE UP (ref 70–99)
GLUCOSE SERPL-MCNC: 91 MG/DL — SIGNIFICANT CHANGE UP (ref 70–99)
HCT VFR BLD CALC: 39.8 % — SIGNIFICANT CHANGE UP (ref 39–50)
HDLC SERPL-MCNC: 69 MG/DL — SIGNIFICANT CHANGE UP
HGB BLD-MCNC: 12.5 G/DL — LOW (ref 13–17)
IMM GRANULOCYTES NFR BLD AUTO: 0.2 % — SIGNIFICANT CHANGE UP (ref 0–0.9)
LIPID PNL WITH DIRECT LDL SERPL: 61 MG/DL — SIGNIFICANT CHANGE UP
LYMPHOCYTES # BLD AUTO: 1.07 K/UL — SIGNIFICANT CHANGE UP (ref 1–3.3)
LYMPHOCYTES # BLD AUTO: 24.7 % — SIGNIFICANT CHANGE UP (ref 13–44)
MAGNESIUM SERPL-MCNC: 1.8 MG/DL — SIGNIFICANT CHANGE UP (ref 1.6–2.6)
MCHC RBC-ENTMCNC: 27.5 PG — SIGNIFICANT CHANGE UP (ref 27–34)
MCHC RBC-ENTMCNC: 31.4 G/DL — LOW (ref 32–36)
MCV RBC AUTO: 87.7 FL — SIGNIFICANT CHANGE UP (ref 80–100)
MONOCYTES # BLD AUTO: 0.41 K/UL — SIGNIFICANT CHANGE UP (ref 0–0.9)
MONOCYTES NFR BLD AUTO: 9.4 % — SIGNIFICANT CHANGE UP (ref 2–14)
NEUTROPHILS # BLD AUTO: 2.71 K/UL — SIGNIFICANT CHANGE UP (ref 1.8–7.4)
NEUTROPHILS NFR BLD AUTO: 62.5 % — SIGNIFICANT CHANGE UP (ref 43–77)
NON HDL CHOLESTEROL: 75 MG/DL — SIGNIFICANT CHANGE UP
NRBC # BLD: 0 /100 WBCS — SIGNIFICANT CHANGE UP (ref 0–0)
NRBC BLD-RTO: 0 /100 WBCS — SIGNIFICANT CHANGE UP (ref 0–0)
PHOSPHATE SERPL-MCNC: 2.3 MG/DL — LOW (ref 2.5–4.5)
PLATELET # BLD AUTO: 145 K/UL — LOW (ref 150–400)
POTASSIUM SERPL-MCNC: 4.2 MMOL/L — SIGNIFICANT CHANGE UP (ref 3.5–5.3)
POTASSIUM SERPL-MCNC: 4.3 MMOL/L — SIGNIFICANT CHANGE UP (ref 3.5–5.3)
POTASSIUM SERPL-SCNC: 4.2 MMOL/L — SIGNIFICANT CHANGE UP (ref 3.5–5.3)
POTASSIUM SERPL-SCNC: 4.3 MMOL/L — SIGNIFICANT CHANGE UP (ref 3.5–5.3)
PROT SERPL-MCNC: 6.6 G/DL — SIGNIFICANT CHANGE UP (ref 6–8.3)
RBC # BLD: 4.54 M/UL — SIGNIFICANT CHANGE UP (ref 4.2–5.8)
RBC # FLD: 13 % — SIGNIFICANT CHANGE UP (ref 10.3–14.5)
SODIUM SERPL-SCNC: 140 MMOL/L — SIGNIFICANT CHANGE UP (ref 135–145)
SODIUM SERPL-SCNC: 140 MMOL/L — SIGNIFICANT CHANGE UP (ref 135–145)
T PALLIDUM AB TITR SER: NEGATIVE — SIGNIFICANT CHANGE UP
TRIGL SERPL-MCNC: 69 MG/DL — SIGNIFICANT CHANGE UP
TSH SERPL-MCNC: 0.95 UIU/ML — SIGNIFICANT CHANGE UP (ref 0.27–4.2)
WBC # BLD: 4.34 K/UL — SIGNIFICANT CHANGE UP (ref 3.8–10.5)
WBC # FLD AUTO: 4.34 K/UL — SIGNIFICANT CHANGE UP (ref 3.8–10.5)

## 2025-01-28 PROCEDURE — 99232 SBSQ HOSP IP/OBS MODERATE 35: CPT

## 2025-01-28 PROCEDURE — 99233 SBSQ HOSP IP/OBS HIGH 50: CPT

## 2025-01-28 RX ORDER — BACTERIOSTATIC SODIUM CHLORIDE 0.9 %
1000 VIAL (ML) INJECTION ONCE
Refills: 0 | Status: COMPLETED | OUTPATIENT
Start: 2025-01-28 | End: 2025-01-28

## 2025-01-28 RX ORDER — SOD PHOSPHATE,MONOBASIC-DIBAS 3MMOL/ML
15 VIAL (ML) INTRAVENOUS ONCE
Refills: 0 | Status: COMPLETED | OUTPATIENT
Start: 2025-01-28 | End: 2025-01-28

## 2025-01-28 RX ADMIN — Medication 75 MILLIGRAM(S): at 12:26

## 2025-01-28 RX ADMIN — Medication 10 MILLIGRAM(S): at 12:26

## 2025-01-28 RX ADMIN — Medication 1000 MILLILITER(S): at 03:11

## 2025-01-28 RX ADMIN — Medication 100 MILLILITER(S): at 22:36

## 2025-01-28 RX ADMIN — Medication 100 MILLILITER(S): at 09:31

## 2025-01-28 RX ADMIN — ASPIRIN 81 MILLIGRAM(S): 81 TABLET, COATED ORAL at 12:26

## 2025-01-28 RX ADMIN — ENOXAPARIN SODIUM 40 MILLIGRAM(S): 100 INJECTION SUBCUTANEOUS at 12:26

## 2025-01-28 RX ADMIN — Medication 62.5 MILLIMOLE(S): at 12:25

## 2025-01-28 NOTE — CONSULT NOTE ADULT - SUBJECTIVE AND OBJECTIVE BOX
Patient is a 69y old  Male who presents with a chief complaint of TIA rule out (27 Jan 2025 14:56)      HPI:  68yo former smoker, with PMHx TIA in 2012 (presented with dysarthria, RUE numbness, noncompliant on aspirin and allergic to Lipitor), HTN, pulmonary sarcoidosis, "leaky valve", presenting to [facility] ED on 01/27/25 with c/o R hand weakness and dysphagia. LKW at 04:15AM. States waking up and trying to make breakfast when suddenly realizing he couldn't use his hand to cut hossein, was drooling from the middle of his mouth and had issues swallowing to the point that he could not take his daily Norvasc. These symptoms lasted for about 30 minutes and resolved. On his way to the ED, started experiencing R foot numbness, which lasted about 5-10 minutes, which also resolved on its own. On arrival, SBP was 185/104. Patient started complaining of L sided facial numbness from V1-V3, most prominent on L V1 distribution and of "L hand being very cold". Of note, patient recently finished course of z-pack and prednisone for "lung infection". Currently denies any dysarthria, dysphagia, facial droop, dizziness, vision changes or extremity weakness/numbness.    -----------------------------------------------------------------------------------------------------------------  IV-thrombolytic (Y/N):  No                              Reason thrombolytic not given: Low NIHSS score (1). Initial symptoms resolved by time of arrival   (27 Jan 2025 06:07)    PAST MEDICAL & SURGICAL HISTORY:  Pulmonary sarcoidosis      TIA (transient ischemic attack)      Hypertension      Knee meniscus pain, left  1980 left knee meniscus repair      History of facial surgery  1978 right cheek bone      History of shoulder surgery  2000 right shoulder bone spur        MEDICATIONS  (STANDING):  aspirin enteric coated 81 milliGRAM(s) Oral daily  clopidogrel Tablet 75 milliGRAM(s) Oral daily  enoxaparin Injectable 40 milliGRAM(s) SubCutaneous every 24 hours  ezetimibe 10 milliGRAM(s) Oral daily  influenza  Vaccine (HIGH DOSE) 0.5 milliLiter(s) IntraMuscular once  sodium chloride 0.9%. 1000 milliLiter(s) (100 mL/Hr) IV Continuous <Continuous>  sodium phosphate 15 milliMole(s)/250 mL IVPB 15 milliMole(s) IV Intermittent once    MEDICATIONS  (PRN):      FAMILY HISTORY:  Family history of lung cancer (Father)    Family history of diabetes mellitus (Mother)        CBC Full  -  ( 28 Jan 2025 05:30 )  WBC Count : 4.34 K/uL  RBC Count : 4.54 M/uL  Hemoglobin : 12.5 g/dL  Hematocrit : 39.8 %  Platelet Count - Automated : 145 K/uL  Mean Cell Volume : 87.7 fl  Mean Cell Hemoglobin : 27.5 pg  Mean Cell Hemoglobin Concentration : 31.4 g/dL  Auto Neutrophil # : 2.71 K/uL  Auto Lymphocyte # : 1.07 K/uL  Auto Monocyte # : 0.41 K/uL  Auto Eosinophil # : 0.10 K/uL  Auto Basophil # : 0.04 K/uL  Auto Neutrophil % : 62.5 %  Auto Lymphocyte % : 24.7 %  Auto Monocyte % : 9.4 %  Auto Eosinophil % : 2.3 %  Auto Basophil % : 0.9 %      01-28    140  |  104  |  9   ----------------------------<  91  4.2   |  28  |  0.85    Ca    8.0[L]      28 Jan 2025 05:30  Phos  2.3     01-28  Mg     1.8     01-28    TPro  6.6  /  Alb  3.6  /  TBili  0.4  /  DBili  x   /  AST  21  /  ALT  11  /  AlkPhos  78  01-28      Urinalysis Basic - ( 28 Jan 2025 05:30 )    Color: x / Appearance: x / SG: x / pH: x  Gluc: 91 mg/dL / Ketone: x  / Bili: x / Urobili: x   Blood: x / Protein: x / Nitrite: x   Leuk Esterase: x / RBC: x / WBC x   Sq Epi: x / Non Sq Epi: x / Bacteria: x        Radiology :     < from: CT Perfusion w/ Maps w/ IV Cont (01.27.25 @ 17:34) >    ACC: [id number] EXAM:  CT ANGIO BRAIN STROKE PROTC IC   ORDERED BY:   RAY CHEW     ACC: [id number] EXAM:  CT ANGIO NECK STROKE PROTCL IC   ORDERED BY:   RAY CHEW     ACC: [id number] EXAM:  CT PERFUSION W MAPS IC   ORDERED BY: RAY CHEW     PROCEDURE DATE:  01/27/2025          INTERPRETATION:  PROCEDURES:  CT perfusion with contrast  CTA brain with intravenous contrast  CTA neck with intravenous contrast    CLINICAL INDICATION: Stroke code. Worsening clinical exam; new slurred  speech, expressive aphasia and worse right facial droop.    TECHNIQUE:    CTP: After the bolus administration of 50 cc Isovue-370, CT perfusion is   obtained as per [facility] protocol. Perfusion maps are provided.  CTA: Multiple axial thin section were obtained through the intracranial   and cervical vasculature following the additional intravenous bolus   injection of 80 cc Isovue-370. MIP series are provided.    COMPARISON STUDIES: Same-day CTA/CTP 5:09 to 5:49 AM.    FINDINGS:    CT PERFUSION:    There is persistent small Tmax elevation seen in white matter and left   frontoparietal junction (6 mm), although smaller than previous reported.   There remains no cerebral blood flow or cerebral blood volume deficit   reported.    Thereis no reported defect in CBF or region of elevated Tmax > 6   seconds, nor mismatch in volume thereof to indicate ischemia or penumbra.    CTA BRAIN:    Previously seen contrast cutoff along a left MCA distal M2 or proximal M3   branch is no longer identified. For example, see sagittal MIP image 21   showing patency with prior sagittal MIP image 35 showing site of   occlusion.    Remainder of intracranial arterial anatomy is patent without significant   stenosis or interval change as to explain worsening clinical symptoms.   Specifically the left internal carotid artery, middle cerebral artery and   anterior cerebral arteries are patent and nonstenotic.    Posterior circulation is unremarkable and unchanged. Left vertebral   artery is dominant. No basilar or vertebral artery stenosis. Both   posterior cerebral arteries are patent without stenosis or proximal   branch occlusion.    No intracranial aneurysm or high flow vascular malformation is   appreciated.    Venous opacification on the exam is slightly suboptimal but there is no   filling defect as the suspected thrombosis.    CTA NECK:    The aortic arch is normal size and shows patency, with normal 3 vessel   configuration and no significant great vessel stenosis.    Both common carotid arteries are patent and nonstenotic up to the   bifurcations.  Left ICA: Normal caliber and nonstenotic. No filling defect or evidence   of dissection.  Right ICA: Normal caliber and nonstenotic. No filling defect or evidence   of dissection.    Vertebral arteries: Patent throughout from origin to foramen magnum. No   hemodynamically significant stenosis or evidence of dissection. Left side   is dominant.    Lung apices demonstrate apical fibrotic and emphysematous changes.   Bronchiectasisis more visible on the right.      IMPRESSION:    CT PERFUSION:    Small left frontoparietal Tmax elevation site persists, although smaller   than previously reported. No large/territorial deficit.    CTA INTRACRANIAL:    Resolution of left MCA distal M2 or proximal M3 branch occlusion compared   to earlier same day study, now appearing patent.    No additional or new steno-occlusive focus to explain symptoms. MRI/MRA   is pending.    CTA EXTRACRANIAL:    No arterial steno-occlusive disease or evidence of dissection.      < from: CT Brain Stroke Protocol (01.27.25 @ 17:16) >  ACC: [id number] EXAM:  CT BRAIN STROKE PROTOCOL   ORDERED BY: RAY CHEW     PROCEDURE DATE:  01/27/2025          INTERPRETATION:  CLINICAL INFORMATION: Stroke code. Worsening exam    TECHNIQUE: CT imaging of the brain is performed with multiplanar images   reviewed and displayed with both bone and soft tissue algorithm. No   contrast given.    COMPARISON: Same day head CT 5:44 AM    FINDINGS:    Ventricles, cisterns and sulci are unchanged. No hydrocephalus, mass   effect or midline shift.    No acute intracranial hemorrhage or extra-axial fluid collection.    Gray to white differentiation appears preserved, without CT evidence of   recent transcortical or territorial infarct. Specifically no left-sided   MCA region infarct is apparent with recent CTA reporting a left M2   occlusion.    Bone algorithm images show no calvarial fracture or acute abnormality of   the calvarium or skull base. Paranasal sinuses and mastoids included on   this scan show no acute disease.      IMPRESSION:    No acute intracranial hemorrhage, mass effect or demarcated infarct. No   change since earlier same day.         Review of Systems : per HPI         Vital Signs Last 24 Hrs  T(C): 36.8 (28 Jan 2025 05:16), Max: 36.9 (27 Jan 2025 14:11)  T(F): 98.3 (28 Jan 2025 05:16), Max: 98.5 (27 Jan 2025 14:11)  HR: 68 (28 Jan 2025 07:09) (58 - 80)  BP: 164/74 (28 Jan 2025 07:09) (125/59 - 172/90)  BP(mean): 107 (28 Jan 2025 07:09) (84 - 112)  RR: 18 (28 Jan 2025 07:09) (17 - 18)  SpO2: 97% (28 Jan 2025 07:09) (95% - 99%)    Parameters below as of 28 Jan 2025 07:09  Patient On (Oxygen Delivery Method): room air            Physical Exam:   69 y o man lying comfortably in semi East's position , awake , alert , no acute complaints     Head: normocephalic , atraumatic    Eyes: PERRLA , EOMI , no nystagmus , sclera anicteric    ENT / FACE: neg nasal discharge , uvula midline , no oropharyngeal erythema / exudate    Neck: supple , negative JVD , negative carotid bruits , no thyromegaly    Chest: CTA bilaterally , neg wheeze / rhonchi / rales / crackles / egophany    Cardiovascular: regular rate and rhythm , neg murmurs / rubs / gallops    Abdomen: soft , non distended , no tenderness to palpation in all 4 quadrants ,  normal bowel sounds     Extremities: WWP , neg cyanosis /clubbing / edema     Neurologic Exam:     Alert and oriented to person , place , date/year , speech fluent w/o dysarthria , follows commands , recent and remote memory intact , repetition intact , comprehension intact ,  attention/concentration intact , fund of knowledge appropriate    Cranial Nerves:           II:                         pupils equal , round and reactive to light , visual fields intact         III/ IV/VI:             extraocular movements intact , neg nystagmus , neg ptosis        V:                       L V1/2 80% , V1-3 normal        VII:                      face symmetric , no droop , normal eye closure and smile        VIII:                     hearing intact to finger rub bilaterally        IX and X:             no hoarseness , gag intact , palate/ uvula rise symmetrically        XI:                       SCM / trapezius strength intact bilateral        XII:                      no tongue deviation    Motor Exam:        > 4/5 x 4 extremities , without drift     Sensation:         intact to light touch x 4 extremities                            no neglect or extinction on double simultaneous testing    DTR:           biceps/brachioradialis: equal                            patella/ankle: equal          neg Babinski     Coordination:            Finger to Nose:  neg dysmetria bilaterally      Gait:  not tested         PM&R Impression: admitted for TIA work up    - no acute pathology on CT brain imaging , MRI pending        Recommendations / Plan:       1) Physical / Occupational therapy focusing on therapeutic exercises , equipment evaluation , bed mobility/transfer out of bed evaluation , progressive ambulation with assistive devices prn .    2) Current disposition plan recommendation:    pending functional progress

## 2025-01-28 NOTE — PROGRESS NOTE ADULT - ASSESSMENT
69M with history of biopsy-Dx pulmonary sarcoidosis in 1981 intermittently treated with courses of prednisone, TIA in 2012, HTN, who is admitted for work up of acute RUE weakness and found to have L distal M2 occlusion.    #CVA work up  - CTH: neg  - CTA: distal M2 occlusion  - repeat CTH/CTA for stroke code 1/27 PM: CTP with smaller area of TMAX along L frontoparietal region, CTA with resolution of L distal M2 and proximal M3 occlusion, CTA negative  - pending bMRI w/wo, NOVA and SPECT for bypass work up  - c/w DAPT, on zetia instead of statin as pt reports allergy (?kidney injury)  - on thrombectomy watch, q2h neuro exam  - TTE, ADDIE, PT/OT  - LDL 61, A1c 5.6%, TSH 0.947  - on IVF to maintain higher BP for perfusion    #HTN  - BP goal per primary, -200  - holding home BP meds    #pulmonary sarcoidosis  #pulmonary fibrosis  Follows with Dr. Vazquez and last seen 11/2024 - noted to have pulmonary fibrosis affecting lower lung zones more significantly which may reflect injury from inhalation vs COVID in addition to sarcoid.  - currently on room air with chronic cough otherwise no other respiratory Sx  - no e/o other organ involvement    #EKG abn  - TWI in inferior leads and V4, V5, previously noted on prior documentation  - QTc 470  - had recent stress echo 7/2024 with preserved EF and no WMA or e/o ischemia  - chest pain free at present    DVT ppx: lovenox  Diet: DASH  LDA: none  Full code  Dispo: TBD

## 2025-01-28 NOTE — PROGRESS NOTE ADULT - ASSESSMENT
Assessment  68yo former smoker, with PMHx TIA in 2012 (presented with dysarthria, RUE numbness, noncompliant on aspirin and allergic to Lipitor), HTN, pulmonary sarcoidosis, "leaky valve", presenting to [facility] ED on 01/27/25 with c/o R hand weakness and dysphagia. LKW at 04:15AM. States waking up and trying to make breakfast when suddenly realized he couldn't use his hand to cut hossein, was drooling from the middle of his mouth and had issues swallowing to the point that he could not take his daily Norvasc. Symptoms lasted for about 30 minutes and resolved. On his way to the ED, started experiencing R foot numbness, which lasted about 5-10 minutes, which also resolved on its own. On arrival, Patient started complaining of L sided facial numbness from V1-V3, most prominent on L V1 distribution and of "L hand being very cold". Patient recently finished course of z-pack and prednisone for "lung infection". Pt admitted to stroke tele on thrombectomy watch. 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     Neuro  #CVA workup  - Loaded w/ aspirin 162mg and 300mg of plavix once 1/27  - continue aspirin 81mg and plavix 75mg daily  - Hold statin- pt allergic, Zetia 10mg PO daily started instead   - q2hr stroke neuro checks and q2hr vitals  - obtain MRI Brain without contrast, NOVA, SPECT for bypass w/u  - Stroke Code HCT Results: neg  - Stroke Code CTA Results: distal m2 occlusion   - Stroke education    Cards  #HTN  - Goal -200  - hold home blood pressure medication for now  - Hold TTE until tmrw 1/28 , on strict bed rest   - hold ADDIE as do not want to drop pressure  - Stroke Code EKG Results: T wave abnormality, consider inferolateral ischemia Prolonged QT    #HLD  - Statin started as stated above   - LDL results: 61    Pulm  - call provider if SPO2 < 94%    GI  #Nutrition/Fluids/Electrolytes   - replete K<4 and Mg <2  - Diet: Regular   - IVF: 100ml/hr    Renal  - trend BMP    Infectious Disease  - trend CBC    Endocrine  - A1C results: 5.6     - TSH results: 0.947    DVT Prophylaxis  - lovenox sq for DVT prophylaxis   - SCDs for DVT prophylaxis     Pt discussed during rounds with Dr. Feliciano    Dispo: no needs     Discussed daily hospital plans and goals with patient     Assessment  70yo former smoker, with PMHx TIA in 2012 (presented with dysarthria, RUE numbness, noncompliant on aspirin and allergic to Lipitor), HTN, pulmonary sarcoidosis, "leaky valve", presenting to [facility] ED on 01/27/25 with c/o R hand weakness and dysphagia. LKW at 04:15AM. States waking up and trying to make breakfast when suddenly realized he couldn't use his hand to cut hossein, was drooling from the middle of his mouth and had issues swallowing to the point that he could not take his daily Norvasc. Symptoms lasted for about 30 minutes and resolved. On his way to the ED, started experiencing R foot numbness, which lasted about 5-10 minutes, which also resolved on its own. On arrival, Patient started complaining of L sided facial numbness from V1-V3, most prominent on L V1 distribution and of "L hand being very cold". Patient recently finished course of z-pack and prednisone for "lung infection". Pt admitted to stroke tele on thrombectomy watch. 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     Neuro  #CVA workup  - continue aspirin 81mg and plavix 75mg daily  - Hold statin- pt allergic, Zetia 10mg PO daily started instead   - q4hr stroke neuro checks and q4hr vitals  - obtain MRI Brain without contrast, NOVA, SPECT for bypass w/u  - Stroke Code HCT Results: neg  - Stroke Code CTA Results: distal m2 occlusion   - Stroke education    Cards  #HTN  - Goal -180  - hold home blood pressure medication for now  - Obtain TTE   - Stroke Code EKG Results: T wave abnormality, consider inferolateral ischemia Prolonged QT    #HLD  - Statin started as stated above   - LDL results: 61    Pulm  - call provider if SPO2 < 94%    GI  #Nutrition/Fluids/Electrolytes   - replete K<4 and Mg <2  - Diet: Regular   - IVF: 100ml/hr    Renal  - trend BMP    Infectious Disease  - trend CBC    Endocrine  - A1C results: 5.6     - TSH results: 0.947    DVT Prophylaxis  - lovenox sq for DVT prophylaxis   - SCDs for DVT prophylaxis     Pt discussed during rounds with Dr. Feliciano [place]: no needs     Discussed daily hospital plans and goals with patient     Assessment  68yo former smoker, with PMHx TIA in 2012 (presented with dysarthria, RUE numbness, noncompliant on aspirin and allergic to Lipitor), HTN, pulmonary sarcoidosis, "leaky valve", presenting to [facility] ED on 01/27/25 with c/o R hand weakness and dysphagia. LKW at 04:15AM. States waking up and trying to make breakfast when suddenly realized he couldn't use his hand to cut hossein, was drooling from the middle of his mouth and had issues swallowing to the point that he could not take his daily Norvasc. Symptoms lasted for about 30 minutes and resolved. On his way to the ED, started experiencing R foot numbness, which lasted about 5-10 minutes, which also resolved on its own. On arrival, Patient started complaining of L sided facial numbness from V1-V3, most prominent on L V1 distribution and of "L hand being very cold". Patient recently finished course of z-pack and prednisone for "lung infection". Pt admitted to stroke tele on thrombectomy watch. 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 Repeat stroke code called 1/27 for new aphasia and dysarthria. CT imaging with now open L m2 and improved CTP.    Neuro  #CVA workup  - continue aspirin 81mg and plavix 75mg daily  - Hold statin- pt allergic, Zetia 10mg PO daily started instead   - q4hr stroke neuro checks and q4hr vitals  - obtain MRI Brain without contrast, NOVA, SPECT for bypass w/u. went for SPECT part 1 on 1/28  - Stroke Code HCT Results: neg  - Stroke Code CTA Results: distal m2 occlusion   - Stroke education  - Repeat stroke code called 1/27 for new aphasia and dysarthria. CT imaging with now open L m2 and improved CTP.    Cards  #HTN  - Goal -180  - hold home blood pressure medication for now  - Obtain TTE and ADDIE, NPO after MN  - Stroke Code EKG Results: T wave abnormality, consider inferolateral ischemia Prolonged QT    #HLD  - Statin started as stated above   - LDL results: 61    Pulm  - call provider if SPO2 < 94%    GI  #Nutrition/Fluids/Electrolytes   - replete K<4 and Mg <2  - Diet: Regular   - IVF: 100ml/hr    Renal  - trend BMP    Infectious Disease  - trend CBC    Endocrine  - A1C results: 5.6     - TSH results: 0.947    DVT Prophylaxis  - lovenox sq for DVT prophylaxis   - SCDs for DVT prophylaxis     Pt discussed during rounds with Dr. Wiggins    Dispo: no needs     Discussed daily hospital plans and goals with patient     Assessment  68yo former smoker, with PMHx TIA in 2012 (presented with dysarthria, RUE numbness, noncompliant on aspirin and allergic to Lipitor), HTN, pulmonary sarcoidosis, "leaky valve", presenting to [facility] ED on 01/27/25 with c/o R hand weakness and dysphagia. LKW at 04:15AM. States waking up and trying to make breakfast when suddenly realized he couldn't use his hand to cut hossein, was drooling from the middle of his mouth and had issues swallowing to the point that he could not take his daily Norvasc. Symptoms lasted for about 30 minutes and resolved. On his way to the ED, started experiencing R foot numbness, which lasted about 5-10 minutes, which also resolved on its own. On arrival, Patient started complaining of L sided facial numbness from V1-V3, most prominent on L V1 distribution and of "L hand being very cold". Patient recently finished course of z-pack and prednisone for "lung infection". Pt admitted to stroke tele on thrombectomy watch. 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 Repeat stroke code called 1/27 for new aphasia and dysarthria. CT imaging with now open L m2 and improved CTP.    Neuro  #CVA workup  - continue aspirin 81mg and plavix 75mg daily  - Hold statin- pt allergic, Zetia 10mg PO daily started instead   - q4hr stroke neuro checks and q4hr vitals  - obtain MRI Brain without contrast, no need for bypass w/u as M2 occlusion opened up  - Stroke Code HCT Results: neg  - Stroke Code CTA Results: distal m2 occlusion   - Stroke education  - Repeat stroke code called 1/27 for new aphasia and dysarthria. CT imaging with now open L m2 and improved CTP.  - call EP in AM for ILR    Cards  #HTN  - Goal -180  - hold home blood pressure medication for now  - Obtain TTE and ADDIE, NPO after MN  - Stroke Code EKG Results: T wave abnormality, consider inferolateral ischemia Prolonged QT    #HLD  - Statin started as stated above   - LDL results: 61    Pulm  - call provider if SPO2 < 94%    GI  #Nutrition/Fluids/Electrolytes   - replete K<4 and Mg <2  - Diet: Regular   - IVF: 100ml/hr    Renal  - trend BMP    Infectious Disease  - trend CBC    Endocrine  - A1C results: 5.6     - TSH results: 0.947    DVT Prophylaxis  - lovenox sq for DVT prophylaxis   - SCDs for DVT prophylaxis     Pt discussed during rounds with Dr. Wiggins    Dispo: no needs     Discussed daily hospital plans and goals with patient

## 2025-01-28 NOTE — PROGRESS NOTE ADULT - SUBJECTIVE AND OBJECTIVE BOX
Patient is a 69y old  Male who presents with a chief complaint of TIA rule out (28 Jan 2025 09:31)    INTERVAL EVENTS:  - pending bMRI, NOVA, SPECT today  - stroke code called overnight due to aphasia, repeat CTP with smaller area of TMAX along L frontoparietal region, CTA with resolution of L distal M2 and proximal M3 occlusion, CTA negative    SUBJECTIVE:  - Patient was seen and examined at bedside, states that he feels "fine" and that all symptoms have resolved including L facial numbness, L hand and R hand weakness  - feels that he is at baseline  - not having a significant cough or SOB currently    Review of systems: No CP, dyspnea    Diet, DASH/TLC:   Sodium & Cholesterol Restricted (01-27-25 @ 06:45) [Active]    MEDICATIONS  (STANDING):  aspirin enteric coated 81 milliGRAM(s) Oral daily  clopidogrel Tablet 75 milliGRAM(s) Oral daily  enoxaparin Injectable 40 milliGRAM(s) SubCutaneous every 24 hours  ezetimibe 10 milliGRAM(s) Oral daily  influenza  Vaccine (HIGH DOSE) 0.5 milliLiter(s) IntraMuscular once  sodium chloride 0.9%. 1000 milliLiter(s) (100 mL/Hr) IV Continuous <Continuous>  sodium phosphate 15 milliMole(s)/250 mL IVPB 15 milliMole(s) IV Intermittent once    MEDICATIONS  (PRN):  Allergies  No Known Allergies  Intolerances    OBJECTIVE:  Vital Signs Last 24 Hrs  T(C): 36.8 (28 Jan 2025 05:16), Max: 36.9 (27 Jan 2025 14:11)  T(F): 98.3 (28 Jan 2025 05:16), Max: 98.5 (27 Jan 2025 14:11)  HR: 66 (28 Jan 2025 09:30) (58 - 76)  BP: 135/63 (28 Jan 2025 09:30) (116/60 - 164/74)  BP(mean): 91 (28 Jan 2025 09:30) (80 - 112)  RR: 16 (28 Jan 2025 09:30) (16 - 18)  SpO2: 98% (28 Jan 2025 09:30) (95% - 99%)    Parameters below as of 28 Jan 2025 09:30  Patient On (Oxygen Delivery Method): room air    I&O's Summary  27 Jan 2025 07:01  -  28 Jan 2025 07:00  --------------------------------------------------------  IN: 2525 mL / OUT: 2740 mL / NET: -215 mL    28 Jan 2025 07:01  -  28 Jan 2025 12:19  --------------------------------------------------------  IN: 400 mL / OUT: 800 mL / NET: -400 mL    PHYSICAL EXAM  Gen: Reclining in bed at time of exam, appears younger than stated age, INAD  HEENT: NCAT, MMM  CV: RRR, +S1/S2  Pulm: adequate respiratory effort, no increase in work of breathing on room air, no wheezing or rhonchi  Abd: soft, ND  Skin: warm and dry,   Ext: wwp, no peripheral edema  Neuro: AOx3, speaking in full sentences    LABS:                        12.5   4.34  )-----------( 145      ( 28 Jan 2025 05:30 )             39.8     01-28    140  |  104  |  9   ----------------------------<  91  4.2   |  28  |  0.85    Ca    8.0[L]      28 Jan 2025 05:30  Phos  2.3     01-28  Mg     1.8     01-28    TPro  6.6  /  Alb  3.6  /  TBili  0.4  /  DBili  x   /  AST  21  /  ALT  11  /  AlkPhos  78  01-28    LIVER FUNCTIONS - ( 28 Jan 2025 05:30 )  Alb: 3.6 g/dL / Pro: 6.6 g/dL / ALK PHOS: 78 U/L / ALT: 11 U/L / AST: 21 U/L / GGT: x           PT/INR - ( 27 Jan 2025 05:36 )   PT: 11.5 sec;   INR: 1.00          PTT - ( 27 Jan 2025 05:36 )  PTT:32.3 sec  CAPILLARY BLOOD GLUCOSE      POCT Blood Glucose.: 109 mg/dL (27 Jan 2025 16:49)    Urinalysis Basic - ( 28 Jan 2025 05:30 )    Color: x / Appearance: x / SG: x / pH: x  Gluc: 91 mg/dL / Ketone: x  / Bili: x / Urobili: x   Blood: x / Protein: x / Nitrite: x   Leuk Esterase: x / RBC: x / WBC x   Sq Epi: x / Non Sq Epi: x / Bacteria: x        MICRODATA:      RADIOLOGY/OTHER STUDIES:

## 2025-01-28 NOTE — PROGRESS NOTE ADULT - SUBJECTIVE AND OBJECTIVE BOX
Neurology Stroke Progress Note    INTERVAL HPI/OVERNIGHT EVENTS:  Patient seen and examined.  number ______ used.    MEDICATIONS  (STANDING):  aspirin enteric coated 81 milliGRAM(s) Oral daily  clopidogrel Tablet 75 milliGRAM(s) Oral daily  enoxaparin Injectable 40 milliGRAM(s) SubCutaneous every 24 hours  ezetimibe 10 milliGRAM(s) Oral daily  influenza  Vaccine (HIGH DOSE) 0.5 milliLiter(s) IntraMuscular once  sodium chloride 0.9%. 1000 milliLiter(s) (100 mL/Hr) IV Continuous <Continuous>  sodium phosphate 15 milliMole(s)/250 mL IVPB 15 milliMole(s) IV Intermittent once    MEDICATIONS  (PRN):      Allergies    No Known Allergies    Intolerances        Vital Signs Last 24 Hrs  T(C): 36.8 (28 Jan 2025 05:16), Max: 36.9 (27 Jan 2025 14:11)  T(F): 98.3 (28 Jan 2025 05:16), Max: 98.5 (27 Jan 2025 14:11)  HR: 70 (28 Jan 2025 08:45) (58 - 76)  BP: 116/60 (28 Jan 2025 08:45) (116/60 - 164/74)  BP(mean): 80 (28 Jan 2025 08:45) (80 - 112)  RR: 16 (28 Jan 2025 08:45) (16 - 18)  SpO2: 98% (28 Jan 2025 08:45) (95% - 99%)    Parameters below as of 28 Jan 2025 08:45  Patient On (Oxygen Delivery Method): room air        Physical exam:  General: No acute distress, awake and alert  Eyes: Anicteric sclerae, moist conjunctivae, see below for CNs  Neck: trachea midline,  Cardiovascular: Regular rate and rhythm, no murmurs  Pulmonary: Anterior breath sounds clear bilaterally No use of accessory muscles  GI: Abdomen soft, non-distended, non-tender  Extremities: no edema    Neurologic:  -Mental status: Awake, alert, oriented to person, place, and time. Speech is fluent with intact naming, repetition, and comprehension, no dysarthria. Recent and remote memory intact. Follows commands. Attention/concentration intact. Fund of knowledge appropriate.  -Cranial nerves:   II: Visual fields are full to confrontation.  III, IV, VI: Extraocular movements are intact without nystagmus. Pupils equally round and reactive to light  V:  Facial sensation V1-V3 equal and intact   VII: Face is symmetric with normal eye closure and smile  VIII: Hearing is bilaterally intact to finger rub  IX, X: Uvula is midline and soft palate rises symmetrically  XI: Head turning and shoulder shrug are intact.  XII: Tongue protrudes midline  Motor: Normal bulk and tone. No pronator drift. Strength bilateral upper extremity 5/5, bilateral lower extremities 5/5.  Rapid alternating movements intact and symmetric  Sensation: Intact to light touch bilaterally. No neglect or extinction on double simultaneous testing.  Coordination: No dysmetria on finger-to-nose and heel-to-shin bilaterally  Reflexes: Downgoing toes bilaterally   Gait: Narrow gait and steady    LABS:                        12.5   4.34  )-----------( 145      ( 28 Jan 2025 05:30 )             39.8     01-28    140  |  104  |  9   ----------------------------<  91  4.2   |  28  |  0.85    Ca    8.0[L]      28 Jan 2025 05:30  Phos  2.3     01-28  Mg     1.8     01-28    TPro  6.6  /  Alb  3.6  /  TBili  0.4  /  DBili  x   /  AST  21  /  ALT  11  /  AlkPhos  78  01-28    PT/INR - ( 27 Jan 2025 05:36 )   PT: 11.5 sec;   INR: 1.00          PTT - ( 27 Jan 2025 05:36 )  PTT:32.3 sec  Urinalysis Basic - ( 28 Jan 2025 05:30 )    Color: x / Appearance: x / SG: x / pH: x  Gluc: 91 mg/dL / Ketone: x  / Bili: x / Urobili: x   Blood: x / Protein: x / Nitrite: x   Leuk Esterase: x / RBC: x / WBC x   Sq Epi: x / Non Sq Epi: x / Bacteria: x        RADIOLOGY & ADDITIONAL TESTS:     Neurology Stroke Progress Note    INTERVAL HPI/OVERNIGHT EVENTS:  Patient seen and examined.  number not used. Patient states that they "feel better" than yesterday, though there is still a heaviness on the tongue. He states that he can speak but it comes out slower.     MEDICATIONS  (STANDING):  aspirin enteric coated 81 milliGRAM(s) Oral daily  clopidogrel Tablet 75 milliGRAM(s) Oral daily  enoxaparin Injectable 40 milliGRAM(s) SubCutaneous every 24 hours  ezetimibe 10 milliGRAM(s) Oral daily  influenza  Vaccine (HIGH DOSE) 0.5 milliLiter(s) IntraMuscular once  sodium chloride 0.9%. 1000 milliLiter(s) (100 mL/Hr) IV Continuous <Continuous>  sodium phosphate 15 milliMole(s)/250 mL IVPB 15 milliMole(s) IV Intermittent once    MEDICATIONS  (PRN):      Allergies    No Known Allergies    Intolerances        Vital Signs Last 24 Hrs  T(C): 36.8 (28 Jan 2025 05:16), Max: 36.9 (27 Jan 2025 14:11)  T(F): 98.3 (28 Jan 2025 05:16), Max: 98.5 (27 Jan 2025 14:11)  HR: 70 (28 Jan 2025 08:45) (58 - 76)  BP: 116/60 (28 Jan 2025 08:45) (116/60 - 164/74)  BP(mean): 80 (28 Jan 2025 08:45) (80 - 112)  RR: 16 (28 Jan 2025 08:45) (16 - 18)  SpO2: 98% (28 Jan 2025 08:45) (95% - 99%)    Parameters below as of 28 Jan 2025 08:45  Patient On (Oxygen Delivery Method): room air      Physical exam:  General: No acute distress, awake and alert  Eyes: Anicteric sclerae, moist conjunctivae, see below for CNs  Neck: trachea midline,  Cardiovascular: Regular rate and rhythm,   Pulmonary:  No use of accessory muscles    Neurologic:  -Mental status: Awake, alert, oriented to person, place, and time. Speech is intact naming, mild aphasia. Patient states that "its just slower for the words to come out, but i know exactly what I want to say".  Recent and remote memory intact. Follows cross commands.    -Cranial nerves:   II: Visual fields are full to confrontation.  III, IV, VI: Extraocular movements are intact without nystagmus. Pupils equally round and reactive to light  V:  Pt reports tingling and numbness on left side of the face.   VII: Face is asymmetric with new moderate R facial droop (prior to stroke code it was mild).   VIII: Hearing is bilaterally intact.   XII: Tongue protrudes midline  Motor: Normal bulk and tone. No pronator drift. Strength LUE 5/5, RUE 4+/5 when the hands were elevated, the right extremity has a slight twitch. Bilateral lower extremities 5/5.  Sensation: Intact to light touch bilaterally. No neglect or extinction on double simultaneous testing. Sensation back to baseline per patient.   Coordination: No dysmetria on finger-to-nose     LABS:                        12.5   4.34  )-----------( 145      ( 28 Jan 2025 05:30 )             39.8     01-28    140  |  104  |  9   ----------------------------<  91  4.2   |  28  |  0.85    Ca    8.0[L]      28 Jan 2025 05:30  Phos  2.3     01-28  Mg     1.8     01-28    TPro  6.6  /  Alb  3.6  /  TBili  0.4  /  DBili  x   /  AST  21  /  ALT  11  /  AlkPhos  78  01-28    PT/INR - ( 27 Jan 2025 05:36 )   PT: 11.5 sec;   INR: 1.00          PTT - ( 27 Jan 2025 05:36 )  PTT:32.3 sec  Urinalysis Basic - ( 28 Jan 2025 05:30 )    Color: x / Appearance: x / SG: x / pH: x  Gluc: 91 mg/dL / Ketone: x  / Bili: x / Urobili: x   Blood: x / Protein: x / Nitrite: x   Leuk Esterase: x / RBC: x / WBC x   Sq Epi: x / Non Sq Epi: x / Bacteria: x        RADIOLOGY & ADDITIONAL TESTS:     Neurology Stroke Progress Note    INTERVAL HPI/OVERNIGHT EVENTS:  Patient seen and examined.  number not used. Patient states that they "feel better" than yesterday, though there is still a heaviness on the tongue. He states that he can speak but it comes out slower.     MEDICATIONS  (STANDING):  aspirin enteric coated 81 milliGRAM(s) Oral daily  clopidogrel Tablet 75 milliGRAM(s) Oral daily  enoxaparin Injectable 40 milliGRAM(s) SubCutaneous every 24 hours  ezetimibe 10 milliGRAM(s) Oral daily  influenza  Vaccine (HIGH DOSE) 0.5 milliLiter(s) IntraMuscular once  sodium chloride 0.9%. 1000 milliLiter(s) (100 mL/Hr) IV Continuous <Continuous>  sodium phosphate 15 milliMole(s)/250 mL IVPB 15 milliMole(s) IV Intermittent once    MEDICATIONS  (PRN):      Allergies    No Known Allergies    Intolerances        Vital Signs Last 24 Hrs  T(C): 36.8 (28 Jan 2025 05:16), Max: 36.9 (27 Jan 2025 14:11)  T(F): 98.3 (28 Jan 2025 05:16), Max: 98.5 (27 Jan 2025 14:11)  HR: 70 (28 Jan 2025 08:45) (58 - 76)  BP: 116/60 (28 Jan 2025 08:45) (116/60 - 164/74)  BP(mean): 80 (28 Jan 2025 08:45) (80 - 112)  RR: 16 (28 Jan 2025 08:45) (16 - 18)  SpO2: 98% (28 Jan 2025 08:45) (95% - 99%)    Parameters below as of 28 Jan 2025 08:45  Patient On (Oxygen Delivery Method): room air      Physical exam:  General: No acute distress, awake and alert  Eyes: Anicteric sclerae, moist conjunctivae, see below for CNs  Neck: trachea midline,  Cardiovascular: Regular rate and rhythm,   Pulmonary:  No use of accessory muscles    Neurologic:  -Mental status: Awake, alert, oriented to person, place, and time. Speech is intact naming, mild aphasia. Patient states that "its just slower for the words to come out, but I know exactly what I want to say".  Recent and remote memory intact. Follows cross commands.    -Cranial nerves:   II: Visual fields are full to confrontation.  III, IV, VI: Extraocular movements are intact without nystagmus. Pupils equally round and reactive to light  V:  Facial sensation V1-V3 equal and intact   VII: Face is asymmetric with moderate R facial droop.   VIII: Hearing is bilaterally intact.   XII: Tongue protrudes midline  Motor: Normal bulk and tone. No pronator drift. Strength LUE 5/5, RUE 4+/5 when the hands were elevated, the right extremity has a slight twitch. Bilateral lower extremities 5/5.  Sensation: Intact to light touch bilaterally. No neglect or extinction on double simultaneous testing. Sensation back to baseline per patient.   Coordination: No dysmetria on finger-to-nose     LABS:                        12.5   4.34  )-----------( 145      ( 28 Jan 2025 05:30 )             39.8     01-28    140  |  104  |  9   ----------------------------<  91  4.2   |  28  |  0.85    Ca    8.0[L]      28 Jan 2025 05:30  Phos  2.3     01-28  Mg     1.8     01-28    TPro  6.6  /  Alb  3.6  /  TBili  0.4  /  DBili  x   /  AST  21  /  ALT  11  /  AlkPhos  78  01-28    PT/INR - ( 27 Jan 2025 05:36 )   PT: 11.5 sec;   INR: 1.00          PTT - ( 27 Jan 2025 05:36 )  PTT:32.3 sec  Urinalysis Basic - ( 28 Jan 2025 05:30 )    Color: x / Appearance: x / SG: x / pH: x  Gluc: 91 mg/dL / Ketone: x  / Bili: x / Urobili: x   Blood: x / Protein: x / Nitrite: x   Leuk Esterase: x / RBC: x / WBC x   Sq Epi: x / Non Sq Epi: x / Bacteria: x        RADIOLOGY & ADDITIONAL TESTS:    1/28/2025      CTA/CT Perfusion     IMPRESSION:    CT PERFUSION:    Small left frontoparietal Tmax elevation site persists, although smaller   than previously reported. No large/territorial deficit.    CTA INTRACRANIAL:    Resolution of left MCA distal M2 or proximal M3 branch occlusion compared   to earlier same day study, now appearing patent.    No additional or new steno-occlusive focus to explain symptoms. MRI/MRA   is pending.    CTA EXTRACRANIAL:    No arterial steno-occlusive disease or evidence of dissection.    CTH Results   No acute intracranial hemorrhage, mass effect or demarcated infarct. No   change since earlier same day.

## 2025-01-29 ENCOUNTER — RESULT REVIEW (OUTPATIENT)
Age: 70
End: 2025-01-29

## 2025-01-29 ENCOUNTER — TRANSCRIPTION ENCOUNTER (OUTPATIENT)
Age: 70
End: 2025-01-29

## 2025-01-29 DIAGNOSIS — I10 ESSENTIAL (PRIMARY) HYPERTENSION: ICD-10-CM

## 2025-01-29 DIAGNOSIS — D86.0 SARCOIDOSIS OF LUNG: ICD-10-CM

## 2025-01-29 LAB
ALBUMIN SERPL ELPH-MCNC: 3.5 G/DL — SIGNIFICANT CHANGE UP (ref 3.3–5)
ALP SERPL-CCNC: 74 U/L — SIGNIFICANT CHANGE UP (ref 40–120)
ALT FLD-CCNC: 11 U/L — SIGNIFICANT CHANGE UP (ref 10–45)
ANION GAP SERPL CALC-SCNC: 8 MMOL/L — SIGNIFICANT CHANGE UP (ref 5–17)
AST SERPL-CCNC: 21 U/L — SIGNIFICANT CHANGE UP (ref 10–40)
BASOPHILS # BLD AUTO: 0.05 K/UL — SIGNIFICANT CHANGE UP (ref 0–0.2)
BASOPHILS NFR BLD AUTO: 1 % — SIGNIFICANT CHANGE UP (ref 0–2)
BILIRUB SERPL-MCNC: 0.4 MG/DL — SIGNIFICANT CHANGE UP (ref 0.2–1.2)
BUN SERPL-MCNC: 11 MG/DL — SIGNIFICANT CHANGE UP (ref 7–23)
CALCIUM SERPL-MCNC: 8 MG/DL — LOW (ref 8.4–10.5)
CHLORIDE SERPL-SCNC: 100 MMOL/L — SIGNIFICANT CHANGE UP (ref 96–108)
CO2 SERPL-SCNC: 28 MMOL/L — SIGNIFICANT CHANGE UP (ref 22–31)
CREAT SERPL-MCNC: 0.87 MG/DL — SIGNIFICANT CHANGE UP (ref 0.5–1.3)
EGFR: 93 ML/MIN/1.73M2 — SIGNIFICANT CHANGE UP
EOSINOPHIL # BLD AUTO: 0.15 K/UL — SIGNIFICANT CHANGE UP (ref 0–0.5)
EOSINOPHIL NFR BLD AUTO: 2.9 % — SIGNIFICANT CHANGE UP (ref 0–6)
GLUCOSE SERPL-MCNC: 94 MG/DL — SIGNIFICANT CHANGE UP (ref 70–99)
HCT VFR BLD CALC: 40.6 % — SIGNIFICANT CHANGE UP (ref 39–50)
HGB BLD-MCNC: 12.9 G/DL — LOW (ref 13–17)
IMM GRANULOCYTES NFR BLD AUTO: 0.2 % — SIGNIFICANT CHANGE UP (ref 0–0.9)
LYMPHOCYTES # BLD AUTO: 1 K/UL — SIGNIFICANT CHANGE UP (ref 1–3.3)
LYMPHOCYTES # BLD AUTO: 19.3 % — SIGNIFICANT CHANGE UP (ref 13–44)
MAGNESIUM SERPL-MCNC: 1.6 MG/DL — SIGNIFICANT CHANGE UP (ref 1.6–2.6)
MCHC RBC-ENTMCNC: 27.9 PG — SIGNIFICANT CHANGE UP (ref 27–34)
MCHC RBC-ENTMCNC: 31.8 G/DL — LOW (ref 32–36)
MCV RBC AUTO: 87.7 FL — SIGNIFICANT CHANGE UP (ref 80–100)
MONOCYTES # BLD AUTO: 0.59 K/UL — SIGNIFICANT CHANGE UP (ref 0–0.9)
MONOCYTES NFR BLD AUTO: 11.4 % — SIGNIFICANT CHANGE UP (ref 2–14)
NEUTROPHILS # BLD AUTO: 3.39 K/UL — SIGNIFICANT CHANGE UP (ref 1.8–7.4)
NEUTROPHILS NFR BLD AUTO: 65.2 % — SIGNIFICANT CHANGE UP (ref 43–77)
NRBC # BLD: 0 /100 WBCS — SIGNIFICANT CHANGE UP (ref 0–0)
NRBC BLD-RTO: 0 /100 WBCS — SIGNIFICANT CHANGE UP (ref 0–0)
PHOSPHATE SERPL-MCNC: 2.4 MG/DL — LOW (ref 2.5–4.5)
PLATELET # BLD AUTO: 154 K/UL — SIGNIFICANT CHANGE UP (ref 150–400)
POTASSIUM SERPL-MCNC: 4.2 MMOL/L — SIGNIFICANT CHANGE UP (ref 3.5–5.3)
POTASSIUM SERPL-SCNC: 4.2 MMOL/L — SIGNIFICANT CHANGE UP (ref 3.5–5.3)
PROT SERPL-MCNC: 6.5 G/DL — SIGNIFICANT CHANGE UP (ref 6–8.3)
RBC # BLD: 4.63 M/UL — SIGNIFICANT CHANGE UP (ref 4.2–5.8)
RBC # FLD: 13 % — SIGNIFICANT CHANGE UP (ref 10.3–14.5)
SODIUM SERPL-SCNC: 136 MMOL/L — SIGNIFICANT CHANGE UP (ref 135–145)
WBC # BLD: 5.19 K/UL — SIGNIFICANT CHANGE UP (ref 3.8–10.5)
WBC # FLD AUTO: 5.19 K/UL — SIGNIFICANT CHANGE UP (ref 3.8–10.5)

## 2025-01-29 PROCEDURE — 70551 MRI BRAIN STEM W/O DYE: CPT | Mod: 26

## 2025-01-29 PROCEDURE — 78803 RP LOCLZJ TUM SPECT 1 AREA: CPT | Mod: 26

## 2025-01-29 PROCEDURE — 93306 TTE W/DOPPLER COMPLETE: CPT | Mod: 26

## 2025-01-29 PROCEDURE — 93312 ECHO TRANSESOPHAGEAL: CPT | Mod: 26

## 2025-01-29 PROCEDURE — 99233 SBSQ HOSP IP/OBS HIGH 50: CPT

## 2025-01-29 PROCEDURE — 33285 INSJ SUBQ CAR RHYTHM MNTR: CPT

## 2025-01-29 RX ORDER — AMLODIPINE BESYLATE 5 MG
2.5 TABLET ORAL DAILY
Refills: 0 | Status: DISCONTINUED | OUTPATIENT
Start: 2025-01-30 | End: 2025-01-30

## 2025-01-29 RX ORDER — MAGNESIUM OXIDE 400 MG
400 TABLET ORAL ONCE
Refills: 0 | Status: COMPLETED | OUTPATIENT
Start: 2025-01-29 | End: 2025-01-29

## 2025-01-29 RX ORDER — LIDOCAINE HCL/EPINEPHRINE 2 %-1:100K
20 VIAL (ML) INJECTION ONCE
Refills: 0 | Status: COMPLETED | OUTPATIENT
Start: 2025-01-29 | End: 2025-01-29

## 2025-01-29 RX ADMIN — Medication 400 MILLIGRAM(S): at 13:00

## 2025-01-29 RX ADMIN — ASPIRIN 81 MILLIGRAM(S): 81 TABLET, COATED ORAL at 13:01

## 2025-01-29 RX ADMIN — Medication 75 MILLIGRAM(S): at 13:00

## 2025-01-29 RX ADMIN — ENOXAPARIN SODIUM 40 MILLIGRAM(S): 100 INJECTION SUBCUTANEOUS at 13:01

## 2025-01-29 RX ADMIN — Medication 10 MILLIGRAM(S): at 13:01

## 2025-01-29 RX ADMIN — Medication 20 MILLILITER(S): at 14:35

## 2025-01-29 NOTE — DISCHARGE NOTE PROVIDER - NSDCFUADDAPPT_GEN_ALL_CORE_FT
EP in 4-6 weeks (call [phone] EP in 4-6 weeks (call [phone]    Please follow up with the Structural Heart clinic with Dr. Mares on 02/11/2025 at 12:30 pm. If you need to make any changes to your appointment, please call [phone].     Please follow up with your primary care provider within the next two weeks for continued care.     Please call hematology at [phone] to set up an appointment     Please follow up in the stroke clinic within the following 2 weeks.  Please call the Electrophysiology specialists in 4-6 weeks for follow up. They can be contacted by [phone]    Please follow up with the Structural Heart clinic with Dr. Mares on 2025 at 12:30 pm. If you need to make any changes to your appointment, please call [phone].     Please follow up with your primary care provider within the next two weeks for continued care.     Please call hematology at [phone] to set up an appointment     Please follow up in the stroke clinic within the following 2 weeks. Your appointment is set up for 2025 at 11:00 am. If you need to change this appointment, please call: [phone].     Please call the Orthopedics clinic at [phone] for follow up regarding the baker's cyst.

## 2025-01-29 NOTE — DISCHARGE NOTE PROVIDER - NSDCCPCAREPLAN_GEN_ALL_CORE_FT
PRINCIPAL DISCHARGE DIAGNOSIS  Diagnosis: Stroke  Assessment and Plan of Treatment: During this hospital admission, you had an ischemic stroke. During an ischemic stroke, blood stops flowing to part of your brain because of a blockage in the blood vessel. This can damage areas in the brain that control other parts of the body.  Please take your Aspirin and Plavix for blood thinning for 21 days and then only aspirin. Continue to take Atorvastatin for cholesterol medication/blood vessel protection as prescribed to prevent further strokes. Do not skip doses and do not run low on your medication. If you run low on your medication, please contact your doctor.  You will follow up outpatient with the stroke Nurse Practitioner.  Doing your regular tasks may be difficult after you've had a stroke, but you can learn new ways to manage your daily activities. In fact, doing daily activities may help you to regain muscle strength. Be patient, give yourself time to adjust, and appreciate the progress you make. For example, when showering or bathing, test the water temperature with a hand or foot that was not affected by the stroke, use grab bars, a shower seat, a hand-held showerhead, etc. It is normal to feel fatigue after a stroke, while some days may be worse than others, you will continue to improve.  Call 911 right away if you have any of the following symptoms of another stroke:  B: Balance: Sudden: Dizziness, loss of balance, or a sense of falling, difficulty with coordinating movement  E: Eyes: Sudden double vision or trouble seeing in one or both eyes  F: Face: Sudden uneven face  A: Arms (Legs): Sudden weakness, tingling, or loss of feeling on one side of your face or body  S: Speech: Sudden trouble talking or slurred speech, sudden difficulty understanding others  T: Time: Please call 911 right away and go to the emergency room  •Sudden, severe headache  •Blackouts or seizures       PRINCIPAL DISCHARGE DIAGNOSIS  Diagnosis: Stroke  Assessment and Plan of Treatment: During this hospital admission, you had an ischemic stroke. During an ischemic stroke, blood stops flowing to part of your brain because of a blockage in the blood vessel. This can damage areas in the brain that control other parts of the body.  Please take your Eliquis for blood thinning. Continue to take Zetia for cholesterol medication/blood vessel protection as prescribed to prevent further strokes. Do not skip doses and do not run low on your medication. If you run low on your medication, please contact your doctor.  You will follow up outpatient with the stroke Nurse Practitioner.  Doing your regular tasks may be difficult after you've had a stroke, but you can learn new ways to manage your daily activities. In fact, doing daily activities may help you to regain muscle strength. Be patient, give yourself time to adjust, and appreciate the progress you make. For example, when showering or bathing, test the water temperature with a hand or foot that was not affected by the stroke, use grab bars, a shower seat, a hand-held showerhead, etc. It is normal to feel fatigue after a stroke, while some days may be worse than others, you will continue to improve.  Call 911 right away if you have any of the following symptoms of another stroke:  B: Balance: Sudden: Dizziness, loss of balance, or a sense of falling, difficulty with coordinating movement  E: Eyes: Sudden double vision or trouble seeing in one or both eyes  F: Face: Sudden uneven face  A: Arms (Legs): Sudden weakness, tingling, or loss of feeling on one side of your face or body  S: Speech: Sudden trouble talking or slurred speech, sudden difficulty understanding others  T: Time: Please call 911 right away and go to the emergency room  •Sudden, severe headache  •Blackouts or seizures

## 2025-01-29 NOTE — DISCHARGE NOTE PROVIDER - NSDCFUSCHEDAPPT_GEN_ALL_CORE_FT
[facility]  [address]UL[address] [place], [place] [address]  Scheduled Appointment: 02/13/2025    [facility]  [address]ULED [address] [place], [place] [address]  Scheduled Appointment: 02/13/2025    Nico Vazquez  [facility]  [address] [address]  Scheduled Appointment: 02/13/2025     [facility]  [address]MMED [address]  Scheduled Appointment: 02/13/2025    [facility]  PULMMED [address]  Scheduled Appointment: 02/13/2025    Nico Vazquez  [facility]  [address]MMED [address]  Scheduled Appointment: 02/13/2025    [facility]  HEARTV[facility] [address]  Scheduled Appointment: 02/20/2025     Antonio Mares  [facility]  HEARTVASC [address]  Scheduled Appointment: 02/11/2025    [facility]  [address]ULMMED [address]  Scheduled Appointment: 02/13/2025    [facility]  [address]ULMMED [address]  Scheduled Appointment: 02/13/2025    Nico Vazquez  [facility]  [address]MMED [address]  Scheduled Appointment: 02/13/2025    [facility]  HEARTV[facility] [address]  Scheduled Appointment: 02/20/2025

## 2025-01-29 NOTE — PROGRESS NOTE ADULT - SUBJECTIVE AND OBJECTIVE BOX
Feeling a little groggy after ADDIE.  Otherwise denies any new symptoms.      OVERNIGHT EVENTS: NAEO    Remaining ROS negative       PHYSICAL EXAM:    General: nad, lying in bed  HEENT: NC/AT; MMM  Cardiovascular: +S1/S2, RRR  Respiratory: CTA B/L; no W/R/R  Gastrointestinal: soft, NT/ND; +BSx4  Neurological: speech fluent, no facial asymmetry  Psychiatric: pleasant mood and affect  Dermatologic: no appreciable wounds or damage to the skin    VITAL SIGNS:  Vital Signs Last 24 Hrs  T(C): 36.4 (29 Jan 2025 04:58), Max: 37 (28 Jan 2025 14:00)  T(F): 97.5 (29 Jan 2025 04:58), Max: 98.6 (28 Jan 2025 14:00)  HR: 62 (29 Jan 2025 08:25) (59 - 70)  BP: 154/72 (29 Jan 2025 08:25) (130/60 - 171/72)  BP(mean): 103 (29 Jan 2025 08:25) (86 - 108)  RR: 18 (29 Jan 2025 08:25) (16 - 19)  SpO2: 96% (29 Jan 2025 08:25) (93% - 98%)    Parameters below as of 29 Jan 2025 08:25  Patient On (Oxygen Delivery Method): room air          MEDICATIONS:  MEDICATIONS  (STANDING):  aspirin enteric coated 81 milliGRAM(s) Oral daily  clopidogrel Tablet 75 milliGRAM(s) Oral daily  enoxaparin Injectable 40 milliGRAM(s) SubCutaneous every 24 hours  ezetimibe 10 milliGRAM(s) Oral daily  influenza  Vaccine (HIGH DOSE) 0.5 milliLiter(s) IntraMuscular once  lidocaine 1%/epinephrine 1:100,000 Inj 20 milliLiter(s) Local Injection once  magnesium oxide 400 milliGRAM(s) Oral once    MEDICATIONS  (PRN):      ALLERGIES:  Allergies    No Known Allergies    Intolerances        LABS:                        12.9   5.19  )-----------( 154      ( 29 Jan 2025 06:04 )             40.6     01-29    136  |  100  |  11  ----------------------------<  94  4.2   |  28  |  0.87    Ca    8.0[L]      29 Jan 2025 06:04  Phos  2.4     01-29  Mg     1.6     01-29    TPro  6.5  /  Alb  3.5  /  TBili  0.4  /  DBili  x   /  AST  21  /  ALT  11  /  AlkPhos  74  01-29      Urinalysis Basic - ( 29 Jan 2025 06:04 )    Color: x / Appearance: x / SG: x / pH: x  Gluc: 94 mg/dL / Ketone: x  / Bili: x / Urobili: x   Blood: x / Protein: x / Nitrite: x   Leuk Esterase: x / RBC: x / WBC x   Sq Epi: x / Non Sq Epi: x / Bacteria: x      CAPILLARY BLOOD GLUCOSE      POCT Blood Glucose.: 109 mg/dL (27 Jan 2025 16:49)      RADIOLOGY & ADDITIONAL TESTS: Reviewed.

## 2025-01-29 NOTE — DISCHARGE NOTE PROVIDER - NSDCACTIVITY_GEN_ALL_CORE
Patient : Yury Edwards Age: 60 year old  Sex: male    MRN: [id number]  Encounter Date: 6/3/2022       History     Chief Complaint   Patient presents with   • Epigastric Pain     cough with epigastric pain since 11 this am, denies sob        HPI    Yury Edwards 60 year old male with no significant past medical history presents with epigastric abdominal burning.  Patient reports he had symptoms starting this morning.  He ate a large [employer] sandwich last night.  Also feels some mild coughing.  Denies any fevers chills or chest pain shortness of breath.  No leg swelling or leg pain.  Reports this feels like acid reflux.  Did not take anything prior to arrival.      ALLERGIES:  No Known Allergies      Discharge Medication List as of 6/3/2022  6:10 PM      Prior to Admission Medications    Details   ibuprofen (MOTRIN) 800 MG tablet Take 1 tablet by mouth 3 times daily as needed for Pain.Eprescribe, Disp-30 tablet, R-0      HYDROcodone-acetaminophen (NORCO) 5-325 MG per tablet Indication: Acute PainDelegates - In compliance with state law, the Prescription Drug Monitoring Program must be reviewed prior to signing any order for a controlled substance. PDMP Reviewed by Delegate - No Unexpected ActivityTake 1-2 tablets by mouth  every 4 hours as needed for Pain (take at nite for pain).Eprescribe, Disp-12 tablet, R-0         New Prescriptions    Details   famotidine (PEPCID) 20 MG tablet Take 1 tablet by mouth 2 times daily.Eprescribe, Disp-60 tablet, R-0      calcium carbonate (TUMS) 500 MG chewable tablet Chew 1 tablet by mouth daily.Eprescribe, Disp-4 tablet, R-0              Past Medical History:   Diagnosis Date   • NO HX OF    • NO HX OF      No past surgical history on file.  Social History     Tobacco Use   • Smoking status: Never Smoker   • Smokeless tobacco: Never Used   Substance Use Topics   • Alcohol use: Never   • Drug use: Never     No family history on file.    Review of Systems   Constitutional: Negative for  chills and fever.   HENT: Negative for congestion and rhinorrhea.    Eyes: Negative for photophobia and redness.   Respiratory: Negative for cough and shortness of breath.    Cardiovascular: Negative for chest pain and palpitations.   Gastrointestinal: Positive for abdominal pain. Negative for vomiting.   Endocrine: Negative for polydipsia and polyuria.   Genitourinary: Negative for dysuria and frequency.   Musculoskeletal: Negative for joint swelling and myalgias.   Skin: Negative for pallor and rash.   Neurological: Negative for seizures and headaches.   Psychiatric/Behavioral: Negative for agitation. The patient is not nervous/anxious.                 Physical Exam     ED Triage Vitals [06/03/22 1310]   ED Triage Vitals Group      Temp 99.7 °F (37.6 °C)      Heart Rate 98      Resp 18      BP (!) 155/83      SpO2 95 %      EtCO2 mmHg       Height       Weight       Weight Scale Used       BMI (Calculated)       IBW/kg (Calculated)          Physical Exam  Vitals reviewed.   Constitutional:       General: He is not in acute distress.     Appearance: He is not toxic-appearing.   HENT:      Head: Normocephalic and atraumatic.      Mouth/Throat:      Mouth: Mucous membranes are moist.      Neck: Normal range of motion and neck supple.   Eyes:      Extraocular Movements: Extraocular movements intact.      Conjunctiva/sclera: Conjunctivae normal.   Cardiovascular:      Rate and Rhythm: Normal rate and regular rhythm.   Pulmonary:      Effort: Pulmonary effort is normal.      Breath sounds: Normal breath sounds. No wheezing.   Abdominal:      General: There is no distension.      Palpations: Abdomen is soft.      Tenderness: There is no abdominal tenderness.   Musculoskeletal:         General: No swelling or deformity. Normal range of motion.   Skin:     General: Skin is warm and dry.   Neurological:      General: No focal deficit present.      Mental Status: He is alert. Mental status is at baseline.   Psychiatric:          Mood and Affect: Mood normal.         Behavior: Behavior normal.     :     ED Course   Procedures    Lab Results     Results for orders placed or performed during the hospital encounter of 06/03/22   Comprehensive Metabolic Panel   Result Value    Fasting Status     Sodium 138    Potassium 4.1     Comment: Lipemic Specimen    Chloride 104    Carbon Dioxide 26    Anion Gap 12    Glucose 169 (H)    BUN 15    Creatinine 0.75    Glomerular Filtration Rate >90     Comment: eGFR results = or >60 mL/min/1.73m2 = Normal kidney function. Estimated GFR calculated using the CKD-EPI-R (2021) equation that does not include race in the creatinine calculation.    BUN/ Creatinine Ratio 20    Calcium 8.4    Bilirubin, Total 0.7    GOT/AST 17    GPT/ALT 30    Alkaline Phosphatase 69    Albumin 4.1    Protein, Total 7.7    Globulin 3.6    A/G Ratio 1.1   TROPONIN I, HIGH SENSITIVITY   Result Value    Troponin I, High Sensitivity 4   CBC with Automated Differential (performable only)   Result Value    WBC 6.2    RBC 4.76    HGB 15.1    HCT 44.0    MCV 92.4    MCH 31.7    MCHC 34.3    RDW-CV 13.4    RDW-SD 45.4        NRBC 0    Neutrophil, Percent 70    Lymphocytes, Percent 21    Mono, Percent 7    Eosinophils, Percent 2    Basophils, Percent 0    Immature Granulocytes 0    Absolute Neutrophils 4.3    Absolute Lymphocytes 1.3    Absolute Monocytes 0.4    Absolute Eosinophils  0.2    Absolute Basophils 0.0    Absolute Immmature Granulocytes 0.0   COVID/Flu/RSV panel   Result Value    Rapid SARS-COV-2 by PCR Not Detected    Influenza A by PCR Not Detected    Influenza B by PCR Not Detected    RSV BY PCR Not Detected    Isolation Guidelines      Comment: Do not use this test result as the sole decision-maker for discontinuation of isolation.   Clinical evaluation should be considered for other respiratory illness requiring transmission-based isolation.    -    No fever (<99.0 F/37.2 C) for at least 24 hours without the use of  fever-reducing medications    AND  -    Respiratory symptoms have improved or resolved (e.g. cough, shortness of breath)     AND  -    COVID-19 negative test    See COVID-19 Deisolation Resource Guide    Procedural Comment      Comment: SARS-COV-2 nucleic acid has not been detected indicating the absence of COVID-19.    This test was performed using the [employer] Xpert Xpress SARS-CoV-2/Flu/RSV RT-PCR test that has been given Emergency Use Authorization (EUA) by the [place] Food and Drug Administration (FDA).  These tests are considered definitive and do not need to be confirmed by another method.   TROPONIN I, HIGH SENSITIVITY   Result Value    Troponin I, High Sensitivity 4       EKG Results     NSR, no ST/TW changes, intervals normal, normal axis.    EKG tracing interpreted by ED physician    Radiology Results     Imaging Results          XR CHEST PA AND LATERAL 2 VIEWS (Final result)  Result time 06/03/22 13:54:39    Final result                 Impression:       Normal heart size.  No mediastinal widening or adenopathy.  No lung  consolidation or effusions.  No free air under hemidiaphragms.      Electronically Signed by: RODOLFO PEÑA M.D.   Signed on: 6/3/2022 1:54 PM                Narrative:    EXAM: XR CHEST PA AND LATERAL 2 VIEWS    CLINICAL INDICATION: Epigastric pain    COMPARISON: 04/20/2022    FINDINGS: See impression                                 ED Medication Orders (From admission, onward)    Ordered Start     Status Ordering Provider    06/03/22 1715 06/03/22 1716  famotidine (PEPCID) tablet 20 mg  ONCE         Last MAR action: Given LETICIA TORRES    06/03/22 1715 06/03/22 1716  alum-mag hydroxide+simethicone/lidocaine viscous (2:1) (MAGIC MOUTHWASH/GI COCKTAIL) (compounded) oral suspension 15 mL  ONCE         Last MAR action: Given LETICIA TORRES 60 year old male with no significant past medical history presents with epigastric abdominal burning.   Exam is reassuring and normal.  Suspect acid reflux as etiology of patient's epigastric burning.  Troponin is negative x2.  EKG is nonischemic.  Low concern for ACS.  Patient was instructed to take Pepcid as needed, Tums as needed.  Follow-up with primary care doctor if symptoms continue.    ED Course as of 06/03/22 2246 Fri Jun 03, 2022   1734 Labs are normal. Pt has minimal reflex. Rpt trop now, if neg plan for DC home w pepcid. [EB]      ED Course User Index  [EB] Lisa Yun MD       Clinical Impression     ED Diagnosis   1. Epigastric abdominal pain         Disposition        Disposition        Discharge 6/3/2022  6:09 PM  Yury Edwards discharge to home/self care.            TEACHING ADDENDUM  I participated in the following activities of this patients care: the medical history, the physical exam, medical decision making, the procedure.   I personally performed: supervision of the patient's care, the medical history, the physical exam.   The case was discussed with: the resident.   Procedures: I directly supervised the entire procedure.   Evaluation and management service: I agree with the evaluation and management decisions made in this patient's care.                Lisa Yun MD  06/03/22 6191     Activity as tolerated

## 2025-01-29 NOTE — DISCHARGE NOTE PROVIDER - HOSPITAL COURSE
Hospital course:  68yo former smoker, with PMHx TIA in 2012 (presented with dysarthria, RUE numbness, noncompliant on aspirin and allergic to Lipitor), HTN, pulmonary sarcoidosis, "leaky valve", presenting to [facility] ED on 01/27/25 with c/o transient R hand weakness and dysphagia x 30 minutes. On arrival to the ED, patient started complaining of L sided facial numbness from V1-V3, most prominent on L V1 distribution and of "L hand being very cold." NIHSS of 1. CTH negative, CTA H/N with distal L M2 occlusion with corresponding CTP. Not a candidate for acute intervention given low NIHSS. Admitted to stroke tele for further work up. Repeat stroke code called 1/27 for new aphasia and dysarthria. CT imaging with now open L M2 and improved CTP. ECHO/ADDIE _. MRI _. S/p ILR placement.     During this hospital course, patient had a ischemic stroke located in (left/right.....) as seen on MRI.   The stroke etiology is likely secondary to:  [x]etiology workup still in progress    Patient had the following workup done in house:  CT HEAD: No acute intracranial hemorrhage or demarcated transcortical infarction. If symptoms persist, consider MRI for further assessment if not contraindicated.    CT PERFUSION:  Technical limitations: None.  Core infarction: 0 ml  Penumbra / tissue at risk for active ischemia: 16 ml in the left   frontoparietal region.    CTA NECK: No evidence of significant stenosis or occlusion.    CTA HEAD: Distal left M2 occlusion.    CT Brain Stroke Protocol   IMPRESSION: No acute intracranial hemorrhage, mass effect or demarcated infarct. No  change since earlier same day.    CT PERFUSION: Small left frontoparietal Tmax elevation site persists, although smaller than previously reported. No large/territorial deficit.    CTA INTRACRANIAL: Resolution of left MCA distal M2 or proximal M3 branch occlusion compared to earlier same day study, now appearing patent. No additional or new steno-occlusive focus to explain symptoms. MRI/MRA is pending.    CTA EXTRACRANIAL: No arterial steno-occlusive disease or evidence of dissection.    [x]labs - A1C 5.6, LDL 61, TSH 0.947    Physical exam at discharge:    Discharge NIHSS:     New medications on discharge: ASA 81 mg, Plavix 75 mg, Atorvastatin 80 mg  Labs to be followed up:  Imaging to be done as outpatient:  Further outpatient workup: outpatient follow up with neurology, EP and PCP   Hospital course:  68yo former smoker, with PMHx TIA in 2012 (presented with dysarthria, RUE numbness, noncompliant on aspirin and allergic to Lipitor), HTN, pulmonary sarcoidosis, "leaky valve", presenting to [facility] ED on 01/27/25 with c/o transient R hand weakness and dysphagia x 30 minutes. On arrival to the ED, patient started complaining of L sided facial numbness from V1-V3, most prominent on L V1 distribution and of "L hand being very cold." NIHSS of 1. CTH negative, CTA H/N with distal L M2 occlusion with corresponding CTP. Not a candidate for acute intervention given low NIHSS. Admitted to stroke tele for further work up. Repeat stroke code called 1/27 for new aphasia and dysarthria. CT imaging with now open L M2 and improved CTP.  TTE showed no thrombus or PFO, ADDIE revealed PFO. LE dopplers showed acute DVT in L peroneal and anterior tibial veins, chronic DVTs in R profunda femoral vein and L sided baker's cyst. MRI Head showed acute L MCA strokes. Non-diamox SPECT showed no significant defects. S/p ILR placement. Patient overall improving since admission, and will be discharged with follow up.     During this hospital course, patient had a ischemic stroke located in L MCA as seen on MRI.   The stroke etiology is likely secondary to:    [x] DVT in the setting of PFO    Patient had the following workup done in house:  CT HEAD: No acute intracranial hemorrhage or demarcated transcortical infarction. If symptoms persist, consider MRI for further assessment if not contraindicated.    CT PERFUSION:  Technical limitations: None.  Core infarction: 0 ml  Penumbra / tissue at risk for active ischemia: 16 ml in the left   frontoparietal region.    CTA NECK: No evidence of significant stenosis or occlusion.    CTA HEAD: Distal left M2 occlusion.    CT Brain Stroke Protocol   IMPRESSION: No acute intracranial hemorrhage, mass effect or demarcated infarct. No  change since earlier same day.    CT PERFUSION: Small left frontoparietal Tmax elevation site persists, although smaller than previously reported. No large/territorial deficit.    CTA INTRACRANIAL: Resolution of left MCA distal M2 or proximal M3 branch occlusion compared to earlier same day study, now appearing patent. No additional or new steno-occlusive focus to explain symptoms. MRI/MRA is pending.    CTA EXTRACRANIAL: No arterial steno-occlusive disease or evidence of dissection.    MRI Head non-con: Acute left MCA territory infarctions.    SPECT - non-diamox:  No significant defects could be identified on this non-Diamox study.    US Duplex venous LE b/l: 1. Acute deep venous thrombosis involving left peroneal and anterior   tibial veins.    2. Chronic deep venous thrombosis right profunda femoral vein.    3. Left-sided Baker's cyst.        [x]labs - A1C 5.6, LDL 61, TSH 0.947    Physical exam at discharge:    Neurologic exam at discharge:  -Mental status: Awake, alert, oriented to person, place, and time. Mild dysarthria, patient states "my voice doesn't sound the same".  Recent and remote memory intact. Follows commands.    -Cranial nerves:   II: Visual fields are full to confrontation.  III, IV, VI: Extraocular movements are intact without nystagmus. Pupils equally round and reactive to light  V:  Facial sensation V1-V3 equal and intact   VII: Face is asymmetric with moderate R facial droop.   VIII: Hearing is grossly intact to voice   XII: Tongue protrudes midline  Motor: Normal bulk and tone. No pronator drift. Strength LUE 5/5, RUE 4+/5 when the hands were elevated, the right extremity has a tremor, that is new to the patient. Bilateral lower extremities 5/5.  Sensation: Intact to light touch bilaterally. No neglect or extinction on double simultaneous testing.   Coordination: No dysmetria on finger-to-nose    Discharge NIHSS:     New medications on discharge: Eliquis 5 mg twice a day, Atorvastatin 80 mg once a day  Labs to be followed up: hypercoagulable labs   Imaging to be done as outpatient: none   Further outpatient workup: outpatient follow up with neurology, EP and PCP   Hospital course:  68yo former smoker, with PMHx TIA in 2012 (presented with dysarthria, RUE numbness, noncompliant on aspirin and allergic to Lipitor), HTN, pulmonary sarcoidosis, "leaky valve", presenting to [facility] ED on 01/27/25 with c/o transient R hand weakness and dysphagia x 30 minutes. On arrival to the ED, patient started complaining of L sided facial numbness from V1-V3, most prominent on L V1 distribution and of "L hand being very cold." NIHSS of 1. CTH negative, CTA H/N with distal L M2 occlusion with corresponding CTP. Not a candidate for acute intervention given low NIHSS. Admitted to stroke tele for further work up. Repeat stroke code called 1/27 for new aphasia and dysarthria. CT imaging with now open L M2 and improved CTP.  TTE showed no thrombus or PFO, ADDIE revealed PFO. LE dopplers showed acute DVT in L peroneal and anterior tibial veins, chronic DVTs in R profunda femoral vein and L sided baker's cyst. MRI Head showed acute L MCA strokes. Non-diamox SPECT showed no significant defects. S/p ILR placement. Patient overall improving since admission, and will be discharged with follow up.     During this hospital course, patient had a ischemic stroke located in L MCA as seen on MRI.   The stroke etiology is likely secondary to:    [x] DVT in the setting of PFO    Patient had the following workup done in house:  CT HEAD: No acute intracranial hemorrhage or demarcated transcortical infarction. If symptoms persist, consider MRI for further assessment if not contraindicated.    CT PERFUSION:  Technical limitations: None.  Core infarction: 0 ml  Penumbra / tissue at risk for active ischemia: 16 ml in the left   frontoparietal region.    CTA NECK: No evidence of significant stenosis or occlusion.    CTA HEAD: Distal left M2 occlusion.    CT Brain Stroke Protocol   IMPRESSION: No acute intracranial hemorrhage, mass effect or demarcated infarct. No  change since earlier same day.    CT PERFUSION: Small left frontoparietal Tmax elevation site persists, although smaller than previously reported. No large/territorial deficit.    CTA INTRACRANIAL: Resolution of left MCA distal M2 or proximal M3 branch occlusion compared to earlier same day study, now appearing patent. No additional or new steno-occlusive focus to explain symptoms. MRI/MRA is pending.    CTA EXTRACRANIAL: No arterial steno-occlusive disease or evidence of dissection.    MRI Head non-con: Acute left MCA territory infarctions.    SPECT - non-diamox:  No significant defects could be identified on this non-Diamox study.    US Duplex venous LE b/l: 1. Acute deep venous thrombosis involving left peroneal and anterior   tibial veins.    2. Chronic deep venous thrombosis right profunda femoral vein.    3. Left-sided Baker's cyst.        [x]labs - A1C 5.6, LDL 61, TSH 0.947    Physical exam at discharge:  General: No acute distress, awake and alert  Eyes: Anicteric sclerae, moist conjunctivae, see below for CNs  Neck: trachea midline  Extremities: no edema    Neurologic exam at discharge:  -Mental status: Awake, alert, oriented to person, place, and time. Mild dysarthria, patient states "my voice doesn't sound the same".  Recent and remote memory intact. Follows commands.    -Cranial nerves:   II: Visual fields are full to confrontation.  III, IV, VI: Extraocular movements are intact without nystagmus. Pupils equally round and reactive to light  V:  Facial sensation V1-V3 equal and intact   VII: Face is asymmetric with R facial droop.   VIII: Hearing is grossly intact to voice   XII: Tongue protrudes midline  Motor: Normal bulk and tone. No pronator drift. Strength LUE 5/5, RUE 4+/5 when the hands were elevated, the right extremity has a tremor, that is new to the patient. Bilateral lower extremities 5/5.  Sensation: Intact to light touch bilaterally. No neglect or extinction on double simultaneous testing.   Coordination: No dysmetria on finger-to-nose    Discharge NIHSS: 3    New medications on discharge: Eliquis 5 mg twice a day, zetia 10 mg once a day  Labs to be followed up: hypercoagulable labs   Imaging to be done as outpatient: none   Further outpatient workup: outpatient follow up with neurology, EP, structural heart, and PCP

## 2025-01-29 NOTE — PROGRESS NOTE ADULT - SUBJECTIVE AND OBJECTIVE BOX
Neurology Stroke Progress Note    INTERVAL HPI/OVERNIGHT EVENTS:  Patient seen and examined.      MEDICATIONS  (STANDING):  aspirin enteric coated 81 milliGRAM(s) Oral daily  clopidogrel Tablet 75 milliGRAM(s) Oral daily  enoxaparin Injectable 40 milliGRAM(s) SubCutaneous every 24 hours  ezetimibe 10 milliGRAM(s) Oral daily  influenza  Vaccine (HIGH DOSE) 0.5 milliLiter(s) IntraMuscular once  lidocaine 1%/epinephrine 1:100,000 Inj 20 milliLiter(s) Local Injection once    MEDICATIONS  (PRN):      Allergies    No Known Allergies    Intolerances        Vital Signs Last 24 Hrs  T(C): 36.4 (29 Jan 2025 04:58), Max: 37 (28 Jan 2025 14:00)  T(F): 97.5 (29 Jan 2025 04:58), Max: 98.6 (28 Jan 2025 14:00)  HR: 62 (29 Jan 2025 08:25) (59 - 70)  BP: 154/72 (29 Jan 2025 08:25) (130/60 - 171/72)  BP(mean): 103 (29 Jan 2025 08:25) (86 - 108)  RR: 18 (29 Jan 2025 08:25) (16 - 19)  SpO2: 96% (29 Jan 2025 08:25) (93% - 98%)    Parameters below as of 29 Jan 2025 08:25  Patient On (Oxygen Delivery Method): room air      Neurologic:  -Mental status: Awake, alert, oriented to person, place, and time. Speech is intact naming, mild aphasia. Patient states that "its just slower for the words to come out, but I know exactly what I want to say".  Recent and remote memory intact. Follows cross commands.    -Cranial nerves:   II: Visual fields are full to confrontation.  III, IV, VI: Extraocular movements are intact without nystagmus. Pupils equally round and reactive to light  V:  Facial sensation V1-V3 equal and intact   VII: Face is asymmetric with moderate R facial droop.   VIII: Hearing is bilaterally intact.   XII: Tongue protrudes midline  Motor: Normal bulk and tone. No pronator drift. Strength LUE 5/5, RUE 4+/5 when the hands were elevated, the right extremity has a slight twitch. Bilateral lower extremities 5/5.  Sensation: Intact to light touch bilaterally. No neglect or extinction on double simultaneous testing. Sensation back to baseline per patient.   Coordination: No dysmetria on finger-to-nose    LABS:                        12.9   5.19  )-----------( 154      ( 29 Jan 2025 06:04 )             40.6     01-29    136  |  100  |  11  ----------------------------<  94  4.2   |  28  |  0.87    Ca    8.0[L]      29 Jan 2025 06:04  Phos  2.4     01-29  Mg     1.6     01-29    TPro  6.5  /  Alb  3.5  /  TBili  0.4  /  DBili  x   /  AST  21  /  ALT  11  /  AlkPhos  74  01-29      Urinalysis Basic - ( 29 Jan 2025 06:04 )    Color: x / Appearance: x / SG: x / pH: x  Gluc: 94 mg/dL / Ketone: x  / Bili: x / Urobili: x   Blood: x / Protein: x / Nitrite: x   Leuk Esterase: x / RBC: x / WBC x   Sq Epi: x / Non Sq Epi: x / Bacteria: x        RADIOLOGY & ADDITIONAL TESTS:  reviewed

## 2025-01-29 NOTE — DISCHARGE NOTE PROVIDER - PROVIDER TOKENS
PROVIDER:[TOKEN:[[id number]]] PROVIDER:[TOKEN:[[id number]],SCHEDULEDAPPT:[02/13/2025],SCHEDULEDAPPTTIME:[11:00 AM]],FREE:[LAST:[[facility] [place] [facility]],PHONE:[phone]],FAX:[(   )    -],ADDRESS:[[address] [facility] [facility] [place] [address]]],FREE:[LAST:[[facility] at [facility]],PHONE:[phone]],FAX:[(   )    -],ADDRESS:[[address] [place], [place] [address]]],PROVIDER:[TOKEN:[[id number]],SCHEDULEDAPPT:[02/11/2025],SCHEDULEDAPPTTIME:[12:30 PM]],FREE:[LAST:[Hematology/Medical oncology at [facility]],PHONE:[phone]],FAX:[(   )    -],ADDRESS:[[address] [place] [address]]]

## 2025-01-29 NOTE — PROGRESS NOTE ADULT - SUBJECTIVE AND OBJECTIVE BOX
Procedure- ILR implant  Location- Bedside, [facility]    The rationale for insertion of an implantable loop recorder was discussed with the patient in detail.  The risks of infection, bleeding, pocket hematoma and pain were discussed.  Vendor presence for technical support was also discussed.   Alternatives were reviewed and all questions were answered.  The patient agrees to proceed, and informed consent was signed and placed in the chart.  The patient was in a non-fasting state.    Cloroprep was used to clean procedure site (L chest) and patient was draped in a sterile manner.  The procedure was performed under local anesthetic only.  The site was infiltrated with 1% Lidocaine with 0.001% Epi.  A small incision was made with a specialized scalpel in the region of the fourth intercostal space along the left sternal border.  Using the ILR insertion tool the ILR was “injected” in the subcutaneous plane and deployed (please see CCW report for model number) in the region of optimal R-wave sensing.  Hemostasis was achieved and the wound was closed in a running fashion using 4-0 vicryl  suture.  Skin was closed using Dermabond.  Tegaderm was placed over incision.    Complications- None.        Plan of Care-   -	Patient to keep Tegaderm on for 24 hours and then should be removed by the patient.    -	Patient should follow up with EP in 4-6 weeks (call [phone] for an appointment

## 2025-01-29 NOTE — DISCHARGE NOTE PROVIDER - NSDCMRMEDTOKEN_GEN_ALL_CORE_FT
Norvasc 5 mg oral tablet: 1 tab(s) orally once a day   apixaban 5 mg oral tablet: 1 tab(s) orally every 12 hours  ezetimibe 10 mg oral tablet: 1 tab(s) orally once a day  Norvasc 5 mg oral tablet: 1 tab(s) orally once a day

## 2025-01-29 NOTE — DISCHARGE NOTE PROVIDER - CARE PROVIDERS DIRECT ADDRESSES
[email or web address],DirectAddress_Unknown,DirectAddress_Unknown,DirectAddress_Unknown,DirectAddress_Unknown

## 2025-01-29 NOTE — DISCHARGE NOTE PROVIDER - CARE PROVIDER_API CALL
Abhinav Richardson NP in Family Health  [address] [address]  Phone: [phone]  Fax: [phone]  Follow Up Time:    Abhinav Richardson  NP in Family Health  [address] [address]  Phone: [phone]  Fax: [phone]  Scheduled Appointment: 02/13/2025 11:00 AM    [facility] Physician [facility],   [address] [facility], [place] [address]  Phone: [phone]  Fax: (   )    -  Follow Up Time:     [facility] at [facility],   [address], [place] [address]  Phone: [phone]  Fax: (   )    -  Follow Up Time:     Antonio Mares  Cardiovascular Disease  [address] [address]  Phone: [phone]  Fax: [phone]  Scheduled Appointment: 02/11/2025 12:30 PM    Hematology/Medical oncology at [facility],   [address] [place] [address]  Phone: [phone]  Fax: (   )    -  Follow Up Time:

## 2025-01-29 NOTE — DISCHARGE NOTE PROVIDER - NSDCDCMDCOMP_GEN_ALL_CORE
This document is complete and the patient is ready for discharge. Cheek Interpolation Flap Text: A decision was made to reconstruct the defect utilizing an interpolation axial flap and a staged reconstruction.  A telfa template was made of the defect.  This telfa template was then used to outline the Cheek Interpolation flap.  The donor area for the pedicle flap was then injected with anesthesia.  The flap was excised through the skin and subcutaneous tissue down to the layer of the underlying musculature.  The interpolation flap was carefully excised within this deep plane to maintain its blood supply.  The edges of the donor site were undermined.   The donor site was closed in a primary fashion.  The pedicle was then rotated into position and sutured.  Once the tube was sutured into place, adequate blood supply was confirmed with blanching and refill.  The pedicle was then wrapped with xeroform gauze and dressed appropriately with a telfa and gauze bandage to ensure continued blood supply and protect the attached pedicle.

## 2025-01-29 NOTE — PROGRESS NOTE ADULT - ASSESSMENT
70yo former smoker, with PMHx TIA in 2012 (presented with dysarthria, RUE numbness, noncompliant on aspirin and allergic to Lipitor), HTN, pulmonary sarcoidosis, "leaky valve", presenting to [facility] ED on 01/27/25 with c/o R hand weakness and dysphagia. LKW at 04:15AM. States waking up and trying to make breakfast when suddenly realized he couldn't use his hand to cut hossein, was drooling from the middle of his mouth and had issues swallowing to the point that he could not take his daily Norvasc. Symptoms lasted for about 30 minutes and resolved. On his way to the ED, started experiencing R foot numbness, which lasted about 5-10 minutes, which also resolved on its own. On arrival, Patient started complaining of L sided facial numbness from V1-V3, most prominent on L V1 distribution and of "L hand being very cold". Patient recently finished course of z-pack and prednisone for "lung infection". Pt admitted to stroke tele on thrombectomy watch. 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 Repeat stroke code called 1/27 for new aphasia and dysarthria. CT imaging with now open L m2 and improved CTP.    Neuro  #CVA workup  - continue aspirin 81mg and plavix 75mg daily  - Hold statin- pt allergic, Zetia 10mg PO daily started instead   - q4hr stroke neuro checks and q4hr vitals  - no need for bypass w/u as M2 occlusion opened up  - MRI brain SS to see extent of stroke  - Stroke Code HCT Results: neg  - Stroke Code CTA Results: distal m2 occlusion   - Stroke education  - Repeat stroke code called 1/27 for new aphasia and dysarthria. CT imaging with now open L m2 and improved CTP.  - s/p ILR 1/29    Cards  #HTN  - Goal -180  - start amlodipine 2.5mg on 1/30  - TTE/ADDIE with EF 55%, PFO, pending LE dopplers  - Stroke Code EKG Results: T wave abnormality, consider inferolateral ischemia Prolonged QT    #HLD  - Statin started as stated above   - LDL results: 61    Pulm  - call provider if SPO2 < 94%    GI  #Nutrition/Fluids/Electrolytes   - replete K<4 and Mg <2  - Diet: Regular   - IVF: 100ml/hr    Renal  - trend BMP    Infectious Disease  - trend CBC    Endocrine  - A1C results: 5.6     - TSH results: 0.947    DVT Prophylaxis  - lovenox sq for DVT prophylaxis   - SCDs for DVT prophylaxis     Pt discussed during rounds with Dr. Wiggins    Dispkushal: no needs     Discussed daily hospital plans and goals with patient     68yo former smoker, with PMHx TIA in 2012 (presented with dysarthria, RUE numbness, noncompliant on aspirin and allergic to Lipitor), HTN, pulmonary sarcoidosis, "leaky valve", presenting to [facility] ED on 01/27/25 with c/o R hand weakness and dysphagia. LKW at 04:15AM. States waking up and trying to make breakfast when suddenly realized he couldn't use his hand to cut hossein, was drooling from the middle of his mouth and had issues swallowing to the point that he could not take his daily Norvasc. Symptoms lasted for about 30 minutes and resolved. On his way to the ED, started experiencing R foot numbness, which lasted about 5-10 minutes, which also resolved on its own. On arrival, Patient started complaining of L sided facial numbness from V1-V3, most prominent on L V1 distribution and of "L hand being very cold". Patient recently finished course of z-pack and prednisone for "lung infection". Pt admitted to stroke tele on thrombectomy watch. 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 Repeat stroke code called 1/27 for new aphasia and dysarthria. CT imaging with now open L m2 and improved CTP.    Neuro  #CVA workup  - continue aspirin 81mg and plavix 75mg daily  - Hold statin- pt allergic, Zetia 10mg PO daily started instead   - q4hr stroke neuro checks and q4hr vitals  - no need for bypass w/u as M2 occlusion opened up  - MRI brain SS to see extent of stroke  - Stroke Code HCT Results: neg  - Stroke Code CTA Results: distal m2 occlusion   - Stroke education  - Repeat stroke code called 1/27 for new aphasia and dysarthria. CT imaging with now open L m2 and improved CTP.  - s/p ILR 1/29    Cards  #HTN  - Goal -180  - start amlodipine 2.5mg on 1/30  - TTE/ADDIE with EF 55%, PFO, pending LE dopplers  - Stroke Code EKG Results: T wave abnormality, consider inferolateral ischemia Prolonged QT    #HLD  - Statin started as stated above   - LDL results: 61    Pulm  - call provider if SPO2 < 94%    GI  #Nutrition/Fluids/Electrolytes   - replete K<4 and Mg <2  - Diet: Regular   - IVF: d/c    Renal  - trend BMP    Infectious Disease  - trend CBC    Endocrine  - A1C results: 5.6     - TSH results: 0.947    DVT Prophylaxis  - lovenox sq for DVT prophylaxis   - SCDs for DVT prophylaxis     Pt discussed during rounds with Dr. Wiggins    Dispo: no needs     Discussed daily hospital plans and goals with patient     70yo former smoker, with PMHx TIA in 2012 (presented with dysarthria, RUE numbness, noncompliant on aspirin and allergic to Lipitor), HTN, pulmonary sarcoidosis, "leaky valve", presenting to [facility] ED on 01/27/25 with c/o R hand weakness and dysphagia. LKW at 04:15AM. States waking up and trying to make breakfast when suddenly realized he couldn't use his hand to cut hossein, was drooling from the middle of his mouth and had issues swallowing to the point that he could not take his daily Norvasc. Symptoms lasted for about 30 minutes and resolved. On his way to the ED, started experiencing R foot numbness, which lasted about 5-10 minutes, which also resolved on its own. On arrival, Patient started complaining of L sided facial numbness from V1-V3, most prominent on L V1 distribution and of "L hand being very cold". Patient recently finished course of z-pack and prednisone for "lung infection". Pt admitted to stroke tele on thrombectomy watch. 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 Repeat stroke code called 1/27 for new aphasia and dysarthria. CT imaging with now open L m2 and improved CTP.    Neuro  #CVA workup  - continue aspirin 81mg and plavix 75mg daily  - Hold statin- pt allergic, Zetia 10mg PO daily started instead   - q4hr stroke neuro checks and q4hr vitals  - no need for bypass w/u as M2 occlusion opened up  - MRI brain SS to see extent of stroke  - Stroke Code HCT Results: neg  - Stroke Code CTA Results: distal m2 occlusion   - Stroke education  - Repeat stroke code called 1/27 for new aphasia and dysarthria. CT imaging with now open L m2 and improved CTP.  - s/p ILR 1/29    Cards  #HTN  - Goal -180  - start amlodipine 2.5mg on 1/30  - TTE/ADDIE with EF 55%, +PFO, pending LE dopplers  - Stroke Code EKG Results: T wave abnormality, consider inferolateral ischemia Prolonged QT    #HLD  - Statin started as stated above   - LDL results: 61    Pulm  - call provider if SPO2 < 94%    GI  #Nutrition/Fluids/Electrolytes   - replete K<4 and Mg <2  - Diet: Regular   - IVF: d/c    Renal  - trend BMP    Infectious Disease  - trend CBC    Endocrine  - A1C results: 5.6     - TSH results: 0.947    DVT Prophylaxis  - lovenox sq for DVT prophylaxis   - SCDs for DVT prophylaxis   - pending LE dopplers    Pt discussed during rounds with Dr. Feliciano Hinton: no needs     Discussed daily hospital plans and goals with patient

## 2025-01-30 ENCOUNTER — TRANSCRIPTION ENCOUNTER (OUTPATIENT)
Age: 70
End: 2025-01-30

## 2025-01-30 VITALS — TEMPERATURE: 98 F

## 2025-01-30 DIAGNOSIS — I63.512 CEREBRAL INFARCTION DUE TO UNSPECIFIED OCCLUSION OR STENOSIS OF LEFT MIDDLE CEREBRAL ARTERY: ICD-10-CM

## 2025-01-30 LAB
ANION GAP SERPL CALC-SCNC: 9 MMOL/L — SIGNIFICANT CHANGE UP (ref 5–17)
BUN SERPL-MCNC: 14 MG/DL — SIGNIFICANT CHANGE UP (ref 7–23)
CALCIUM SERPL-MCNC: 8.7 MG/DL — SIGNIFICANT CHANGE UP (ref 8.4–10.5)
CHLORIDE SERPL-SCNC: 97 MMOL/L — SIGNIFICANT CHANGE UP (ref 96–108)
CO2 SERPL-SCNC: 29 MMOL/L — SIGNIFICANT CHANGE UP (ref 22–31)
CREAT SERPL-MCNC: 0.89 MG/DL — SIGNIFICANT CHANGE UP (ref 0.5–1.3)
EGFR: 93 ML/MIN/1.73M2 — SIGNIFICANT CHANGE UP
GLUCOSE SERPL-MCNC: 86 MG/DL — SIGNIFICANT CHANGE UP (ref 70–99)
HCT VFR BLD CALC: 42.1 % — SIGNIFICANT CHANGE UP (ref 39–50)
HGB BLD-MCNC: 13.4 G/DL — SIGNIFICANT CHANGE UP (ref 13–17)
MAGNESIUM SERPL-MCNC: 1.8 MG/DL — SIGNIFICANT CHANGE UP (ref 1.6–2.6)
MCHC RBC-ENTMCNC: 27.7 PG — SIGNIFICANT CHANGE UP (ref 27–34)
MCHC RBC-ENTMCNC: 31.8 G/DL — LOW (ref 32–36)
MCV RBC AUTO: 87 FL — SIGNIFICANT CHANGE UP (ref 80–100)
NRBC # BLD: 0 /100 WBCS — SIGNIFICANT CHANGE UP (ref 0–0)
NRBC BLD-RTO: 0 /100 WBCS — SIGNIFICANT CHANGE UP (ref 0–0)
PHOSPHATE SERPL-MCNC: 2.9 MG/DL — SIGNIFICANT CHANGE UP (ref 2.5–4.5)
PLATELET # BLD AUTO: 166 K/UL — SIGNIFICANT CHANGE UP (ref 150–400)
POTASSIUM SERPL-MCNC: 4.2 MMOL/L — SIGNIFICANT CHANGE UP (ref 3.5–5.3)
POTASSIUM SERPL-SCNC: 4.2 MMOL/L — SIGNIFICANT CHANGE UP (ref 3.5–5.3)
RBC # BLD: 4.84 M/UL — SIGNIFICANT CHANGE UP (ref 4.2–5.8)
RBC # FLD: 13 % — SIGNIFICANT CHANGE UP (ref 10.3–14.5)
SODIUM SERPL-SCNC: 135 MMOL/L — SIGNIFICANT CHANGE UP (ref 135–145)
WBC # BLD: 6.56 K/UL — SIGNIFICANT CHANGE UP (ref 3.8–10.5)
WBC # FLD AUTO: 6.56 K/UL — SIGNIFICANT CHANGE UP (ref 3.8–10.5)

## 2025-01-30 PROCEDURE — 84484 ASSAY OF TROPONIN QUANT: CPT

## 2025-01-30 PROCEDURE — 83090 ASSAY OF HOMOCYSTEINE: CPT

## 2025-01-30 PROCEDURE — 84443 ASSAY THYROID STIM HORMONE: CPT

## 2025-01-30 PROCEDURE — 85027 COMPLETE CBC AUTOMATED: CPT

## 2025-01-30 PROCEDURE — 36415 COLL VENOUS BLD VENIPUNCTURE: CPT

## 2025-01-30 PROCEDURE — 93312 ECHO TRANSESOPHAGEAL: CPT

## 2025-01-30 PROCEDURE — 85307 ASSAY ACTIVATED PROTEIN C: CPT

## 2025-01-30 PROCEDURE — 93970 EXTREMITY STUDY: CPT | Mod: 26

## 2025-01-30 PROCEDURE — 85306 CLOT INHIBIT PROT S FREE: CPT

## 2025-01-30 PROCEDURE — 82962 GLUCOSE BLOOD TEST: CPT

## 2025-01-30 PROCEDURE — 70498 CT ANGIOGRAPHY NECK: CPT | Mod: MC

## 2025-01-30 PROCEDURE — A9557: CPT

## 2025-01-30 PROCEDURE — 93005 ELECTROCARDIOGRAM TRACING: CPT

## 2025-01-30 PROCEDURE — G0452: CPT | Mod: 26

## 2025-01-30 PROCEDURE — 85613 RUSSELL VIPER VENOM DILUTED: CPT

## 2025-01-30 PROCEDURE — 99291 CRITICAL CARE FIRST HOUR: CPT

## 2025-01-30 PROCEDURE — 97161 PT EVAL LOW COMPLEX 20 MIN: CPT

## 2025-01-30 PROCEDURE — 80053 COMPREHEN METABOLIC PANEL: CPT

## 2025-01-30 PROCEDURE — C1764: CPT

## 2025-01-30 PROCEDURE — 85301 ANTITHROMBIN III ANTIGEN: CPT

## 2025-01-30 PROCEDURE — 81241 F5 GENE: CPT

## 2025-01-30 PROCEDURE — 81240 F2 GENE: CPT

## 2025-01-30 PROCEDURE — 85598 HEXAGNAL PHOSPH PLTLT NEUTRL: CPT

## 2025-01-30 PROCEDURE — 70551 MRI BRAIN STEM W/O DYE: CPT | Mod: MC

## 2025-01-30 PROCEDURE — 85025 COMPLETE CBC W/AUTO DIFF WBC: CPT

## 2025-01-30 PROCEDURE — 70450 CT HEAD/BRAIN W/O DYE: CPT | Mod: MC

## 2025-01-30 PROCEDURE — 84295 ASSAY OF SERUM SODIUM: CPT

## 2025-01-30 PROCEDURE — 99222 1ST HOSP IP/OBS MODERATE 55: CPT

## 2025-01-30 PROCEDURE — 83036 HEMOGLOBIN GLYCOSYLATED A1C: CPT

## 2025-01-30 PROCEDURE — 84100 ASSAY OF PHOSPHORUS: CPT

## 2025-01-30 PROCEDURE — 86780 TREPONEMA PALLIDUM: CPT

## 2025-01-30 PROCEDURE — 78803 RP LOCLZJ TUM SPECT 1 AREA: CPT | Mod: MC

## 2025-01-30 PROCEDURE — 80061 LIPID PANEL: CPT

## 2025-01-30 PROCEDURE — 86147 CARDIOLIPIN ANTIBODY EA IG: CPT

## 2025-01-30 PROCEDURE — 93970 EXTREMITY STUDY: CPT

## 2025-01-30 PROCEDURE — 85730 THROMBOPLASTIN TIME PARTIAL: CPT

## 2025-01-30 PROCEDURE — 80048 BASIC METABOLIC PNL TOTAL CA: CPT

## 2025-01-30 PROCEDURE — 0042T: CPT | Mod: MC

## 2025-01-30 PROCEDURE — 93306 TTE W/DOPPLER COMPLETE: CPT

## 2025-01-30 PROCEDURE — 99233 SBSQ HOSP IP/OBS HIGH 50: CPT

## 2025-01-30 PROCEDURE — 83735 ASSAY OF MAGNESIUM: CPT

## 2025-01-30 PROCEDURE — 97165 OT EVAL LOW COMPLEX 30 MIN: CPT

## 2025-01-30 PROCEDURE — 83605 ASSAY OF LACTIC ACID: CPT

## 2025-01-30 PROCEDURE — 86146 BETA-2 GLYCOPROTEIN ANTIBODY: CPT

## 2025-01-30 PROCEDURE — 70496 CT ANGIOGRAPHY HEAD: CPT | Mod: MC

## 2025-01-30 PROCEDURE — 85303 CLOT INHIBIT PROT C ACTIVITY: CPT

## 2025-01-30 PROCEDURE — 85300 ANTITHROMBIN III ACTIVITY: CPT

## 2025-01-30 PROCEDURE — 85610 PROTHROMBIN TIME: CPT

## 2025-01-30 RX ORDER — APIXABAN 5 MG/1
1 TABLET, FILM COATED ORAL
Qty: 60 | Refills: 1
Start: 2025-01-30 | End: 2025-03-30

## 2025-01-30 RX ORDER — EZETIMIBE 10 MG
1 TABLET ORAL
Qty: 30 | Refills: 2
Start: 2025-01-30 | End: 2025-04-29

## 2025-01-30 RX ORDER — APIXABAN 5 MG/1
5 TABLET, FILM COATED ORAL EVERY 12 HOURS
Refills: 0 | Status: DISCONTINUED | OUTPATIENT
Start: 2025-01-30 | End: 2025-01-30

## 2025-01-30 RX ADMIN — APIXABAN 5 MILLIGRAM(S): 5 TABLET, FILM COATED ORAL at 18:19

## 2025-01-30 RX ADMIN — Medication 2.5 MILLIGRAM(S): at 06:25

## 2025-01-30 RX ADMIN — ENOXAPARIN SODIUM 40 MILLIGRAM(S): 100 INJECTION SUBCUTANEOUS at 12:27

## 2025-01-30 RX ADMIN — ASPIRIN 81 MILLIGRAM(S): 81 TABLET, COATED ORAL at 12:27

## 2025-01-30 RX ADMIN — Medication 10 MILLIGRAM(S): at 12:27

## 2025-01-30 RX ADMIN — Medication 75 MILLIGRAM(S): at 12:27

## 2025-01-30 NOTE — CONSULT NOTE ADULT - ASSESSMENT
Neurology    69 y o former smoker, with PMHx TIA in 2012 (presented with dysarthria, RUE numbness, noncompliant on aspirin and allergic to Lipitor), HTN, pulmonary sarcoidosis, "leaky valve", presenting to [facility] ED on 01/27/25 with c/o R hand weakness and dysphagia. LKW at 04:15AM. States waking up and trying to make breakfast when suddenly realizing he couldn't use his hand to cut hossein, was drooling from the middle of his mouth and had issues swallowing to the point that he could not take his daily Norvasc. These symptoms lasted for about 30 minutes and resolved. On his way to the ED, started experiencing R foot numbness, which lasted about 5-10 minutes, which also resolved on its own. On arrival, SBP was 185/104. Patient started complaining of L sided facial numbness from V1-V3, most prominent on L V1 distribution and of "L hand being very cold". Of note, patient recently finished course of z-pack and prednisone for "lung infection". CTH negative, pending final read for CTA and CTP, however with notable perfusion mismatch on L parietal lobe. Admitted to stroke tele for TIA work-up    Neuro  #CVA workup  - continue aspirin 81mg and plavix 75mg daily  - holding atorvastatin - patient allergic  - consider starting zetia  - q4hr stroke neuro checks and vitals  - obtain MRI Brain without contrast  - Stroke Code HCT Results: negative  - Stroke Code CTA Results: pending final read  - Stroke education    Cards  #HTN  - Goal -180  - hold home blood pressure medication for now  - obtain TTE with bubble      #HLD  - high dose statin as above in CVA  - LDL results: pending    Pulm  - call provider if SPO2 < 94%    GI  #Nutrition/Fluids/Electrolytes   - replete K<4 and Mg <2  - Diet: DASH  - IVF: maintenance fluids 50cc/h    Renal  - daily bmp    Infectious Disease  - afebrile on admission    Endocrine  #DM  - A1C results: pending  - ISS    - TSH results: pending    DVT Prophylaxis  - lovenox sq for DVT prophylaxis   - SCDs for DVT prophylaxis      Dispo:pending PT/OT eval      Discussed with Dr. Reed  
69M with history of biopsy-Dx pulmonary sarcoidosis in 1981 intermittently treated with courses of prednisone, TIA in 2012, HTN, who is admitted for work up of acute RUE weakness.    #CVA work up  - CTH: neg  - CTA: distal M2 occlusion  - pending bMRI w/wo, NOVA and SPECT for bypss work up  - c/w DAPT, on zetia instead of statin as pt reports allergy (?kidney injury)  - on thrombectomy watch, q2h neuro exam  - strict bed rest until tomorrow  - deferring TTE, ADDIE, PT/OT  - f/u LDL, A1c, TSH  - on IVF to maintain higher BP for perfusion    #HTN  - BP goal per primary, -200  - holding home BP meds    #pulmonary sarcoidosis  #pulmonary fibrosis  Follows with Dr. Vazquez and last seen 11/2024 - noted to have pulmonary fibrosis affecting lower lung zones more significantly which may reflect injury from inhalation vs COVID in addition to sarcoid.  - currently on room air with chronic cough otherwise no other respiratory Sx  - no e/o other organ involvement    #EKG abn  - TWI in inferior leads and V4, V5, previously noted on prior documentation  - QTc 470  - had recent stress echo 7/2024 with preserved EF and no WMA or e/o ischemia  - chest pain free at present    DVT ppx: lovenox  Diet: DASH  LDA: none  Full code  Dispo: TBD
Assesment:  10yo former smoker, with PMHx TIA in 2012 (presented with dysarthria, RUE numbness, noncompliant on aspirin and allergic to Lipitor), HTN, pulmonary sarcoidosis, "leaky valve", presenting to [facility] ED on 01/27/25 with c/o R hand weakness and dysphagia. During CVA work-up patient found to have a PFO on ADDIE. Structural heart consulted for evaluation.    Plan:  Problem 1: PFO  - ADDIE: Injection of agitated saline via a peripheral vein reveals bubbles in   the left heart within 3 heart beats, most consistent with a patent   foramen ovale, Trace AR, borderline normal LV systolic function  - TTE: Injection of agitated saline via a peripheral vein reveals no   evidence of a right-to-left shunt. Borderline normal EF 50-55%  - EP consulted: ILR placed yesterday  - LE duplex completed, pending read  - please obtain hematology consult and obtain hypercoaguable workup prior to discharge  - plan to have patient follow-up with Dr. Mares in the Structural heart office on Tuesday February 11th 12:30pm   - case discussed with Dr. Mares who agrees with the plan above    Problem 2: CVA Workup  - continue eliquis and zetia  - s/p ILR 1/29  - pending MR brain  - remainder of care per primary team      Problem 3: HTN  - continue norvasc  - remainder of care primary team       Problem 4: Hx of pulmonary sarcoidosis  - follows with Dr. Vazquez and last seen 11/24  - continue to monitor  - remainder of care per primary team     I have reviewed clinical labs tests and reports, radiology tests and reports, as well as old patient medical records, and discussed with the refering physician.

## 2025-01-30 NOTE — DISCHARGE NOTE NURSING/CASE MANAGEMENT/SOCIAL WORK - NSDCPEELIQUIS_GEN_ALL_CORE
Oriented - self; Oriented - place; Oriented - time
Apixaban/Eliquis - Compliance/Apixaban/Eliquis - Dietary Advice/Apixaban/Eliquis - Follow up monitoring/Apixaban/Eliquis - Potential for adverse drug reactions and interactions

## 2025-01-30 NOTE — CONSULT NOTE ADULT - SUBJECTIVE AND OBJECTIVE BOX
Surgeon: Dr. Mares    Requesting Physician: Dr. Wiggins     HISTORY OF PRESENT ILLNESS   68yo former smoker, with PMHx TIA in 2012 (presented with dysarthria, RUE numbness, noncompliant on aspirin and allergic to Lipitor), HTN, pulmonary sarcoidosis, "leaky valve", presenting to [facility] ED on 01/27/25 with c/o R hand weakness and dysphagia. LKW at 04:15AM. States waking up and trying to make breakfast when suddenly realized he couldn't use his hand to cut hossein, was drooling from the middle of his mouth and had issues swallowing to the point that he could not take his daily Norvasc. Symptoms lasted for about 30 minutes and resolved. On his way to the ED, started experiencing R foot numbness, which lasted about 5-10 minutes, which also resolved on its own. On arrival, Patient started complaining of L sided facial numbness from V1-V3, most prominent on L V1 distribution and of "L hand being very cold". Patient recently finished course of z-pack and prednisone for "lung infection". Pt admitted to stroke tele on thrombectomy watch. 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 Repeat stroke code called 1/27 for new aphasia and dysarthria. CT imaging with now open L m2 and improved CTP. During CVA work-up patient found to have a PFO on ADDIE. Structural heart consulted for evaluation.    PAST MEDICAL & SURGICAL HISTORY:  Pulmonary sarcoidosis      TIA (transient ischemic attack)      Hypertension      Knee meniscus pain, left  1980 left knee meniscus repair      History of facial surgery  1978 right cheek bone      History of shoulder surgery  2000 right shoulder bone spur          MEDICATIONS  (STANDING):  amLODIPine   Tablet 2.5 milliGRAM(s) Oral daily  apixaban 5 milliGRAM(s) Oral every 12 hours  ezetimibe 10 milliGRAM(s) Oral daily  influenza  Vaccine (HIGH DOSE) 0.5 milliLiter(s) IntraMuscular once    MEDICATIONS  (PRN):      Allergies    No Known Allergies    Intolerances        SOCIAL HISTORY:  Smoker:  YES / NO        PACK YEARS:                         WHEN QUIT?  ETOH use:  YES / NO               FREQUENCY / QUANTITY:  Ilicit Drug use:  YES / NO  Occupation:  Assisted device use (Cane / Walker):  Live with:    FAMILY HISTORY:  Family history of lung cancer (Father)    Family history of diabetes mellitus (Mother)    Review of Systems  CONSTITUTIONAL:  Denies Fevers / chills, sweats, fatigue, weight loss, weight gain                                      NEURO:  Denies changes in sensation, seizures, syncope, confusion                                                                            EYES:  Denies Blurry vision, discharge, pain, loss of vision                                                                                    ENMT:  Denies Difficulty hearing, vertigo, dysphagia, epistaxis, recent dental work                                       CV:  Denies Chest pain, palpitations, TINSLEY, orthopnea                                                                                          RESPIRATORY:  Denies Wheezing, SOB, cough / sputum, hemoptysis                                                                GI:  Denies Nausea, vomiting, diarrhea, constipation, melena, difficulty swallowing                                               : Denies Hematuria, dysuria, urgency, incontinence                                                                                         MUSKULOSKELETAL:  Denies arthritis, joint swelling, muscle weakness                                                             SKIN/BREAST:  Denies rash, itching, hair loss, masses                                                                                            PSYCH:  Denies depression, anxiety, suicidal ideation                                                                                               HEME/LYMPH:  Denies bruises easily, enlarged lymph nodes, tender lymph nodes                                        ENDOCRINE:  Denies cold intolerance, heat intolerance, polydipsia                                                                  Vital Signs Last 24 Hrs  T(C): 36.9 (30 Jan 2025 09:26), Max: 36.9 (30 Jan 2025 09:26)  T(F): 98.4 (30 Jan 2025 09:26), Max: 98.4 (30 Jan 2025 09:26)  HR: 86 (30 Jan 2025 12:32) (60 - 86)  BP: 170/78 (30 Jan 2025 12:32) (138/66 - 170/78)  BP(mean): 116 (30 Jan 2025 12:32) (93 - 116)  RR: 16 (30 Jan 2025 12:32) (16 - 17)  SpO2: 95% (30 Jan 2025 12:32) (95% - 97%)    Parameters below as of 30 Jan 2025 12:32  Patient On (Oxygen Delivery Method): room air        Physical Exam   General: Patient lying comfortably in bed, no acute distress     Neurological: Alert and oriented. No focal neurological deficits     Cardiovascular: S1S2, RRR, no murmurs appreciated on exam     Respiratory: Clear to ausculation bilaterally, no wheeze/rhonchi/rales    Gastrointestinal: + BS, soft, non tender, non distended     Extremities: Warm and well perfused. No edema, no calf tenderness     Vascular: 2+ Peripheral pulses b/l                                                             LABS:                        13.4   6.56  )-----------( 166      ( 30 Jan 2025 07:45 )             42.1     01-30    135  |  97  |  14  ----------------------------<  86  4.2   |  29  |  0.89    Ca    8.7      30 Jan 2025 07:45  Phos  2.9     01-30  Mg     1.8     01-30    TPro  6.5  /  Alb  3.5  /  TBili  0.4  /  DBili  x   /  AST  21  /  ALT  11  /  AlkPhos  74  01-29      Urinalysis Basic - ( 30 Jan 2025 07:45 )    Color: x / Appearance: x / SG: x / pH: x  Gluc: 86 mg/dL / Ketone: x  / Bili: x / Urobili: x   Blood: x / Protein: x / Nitrite: x   Leuk Esterase: x / RBC: x / WBC x   Sq Epi: x / Non Sq Epi: x / Bacteria: x               Surgeon: Dr. Mares    Requesting Physician: Dr. Wiggins     HISTORY OF PRESENT ILLNESS   68yo former smoker, with PMHx TIA in 2012 (presented with dysarthria, RUE numbness, noncompliant on aspirin and allergic to Lipitor), HTN, pulmonary sarcoidosis, "leaky valve", presenting to [facility] ED on 01/27/25 with c/o R hand weakness and dysphagia. LKW at 04:15AM. States waking up and trying to make breakfast when suddenly realized he couldn't use his hand to cut hossein, was drooling from the middle of his mouth and had issues swallowing to the point that he could not take his daily Norvasc. Symptoms lasted for about 30 minutes and resolved. On his way to the ED, started experiencing R foot numbness, which lasted about 5-10 minutes, which also resolved on its own. On arrival, Patient started complaining of L sided facial numbness from V1-V3, most prominent on L V1 distribution and of "L hand being very cold". Patient recently finished course of z-pack and prednisone for "lung infection". Pt admitted to stroke tele on thrombectomy watch. 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 Repeat stroke code called 1/27 for new aphasia and dysarthria. CT imaging with now open L m2 and improved CTP. During CVA work-up patient found to have a PFO on ADDIE. Structural heart consulted for evaluation.    PAST MEDICAL & SURGICAL HISTORY:  Pulmonary sarcoidosis      TIA (transient ischemic attack)      Hypertension      Knee meniscus pain, left  1980 left knee meniscus repair      History of facial surgery  1978 right cheek bone      History of shoulder surgery  2000 right shoulder bone spur          MEDICATIONS  (STANDING):  amLODIPine   Tablet 2.5 milliGRAM(s) Oral daily  apixaban 5 milliGRAM(s) Oral every 12 hours  ezetimibe 10 milliGRAM(s) Oral daily  influenza  Vaccine (HIGH DOSE) 0.5 milliLiter(s) IntraMuscular once    MEDICATIONS  (PRN):      Allergies    No Known Allergies    Intolerances        SOCIAL HISTORY:  Smoker:  former smoker  ETOH use: no  Ilicit Drug use:  former user, has not in years  Occupation: [employer]    FAMILY HISTORY:  Family history of lung cancer (Father)    Family history of diabetes mellitus (Mother)    Review of Systems  CONSTITUTIONAL:  Denies Fevers / chills, sweats, fatigue, weight loss, weight gain                                      NEURO:  Denies changes in sensation, seizures, syncope, confusion                                                                            EYES:  Denies Blurry vision, discharge, pain, loss of vision                                                                                    ENMT:  Denies Difficulty hearing, vertigo, dysphagia, epistaxis, recent dental work                                       CV:  Denies Chest pain, palpitations, TINSLEY, orthopnea                                                                                          RESPIRATORY:  Denies Wheezing, SOB, cough / sputum, hemoptysis                                                                GI:  Denies Nausea, vomiting, diarrhea, constipation, melena, difficulty swallowing                                               : Denies Hematuria, dysuria, urgency, incontinence                                                                                         MUSKULOSKELETAL:  Denies arthritis, joint swelling, muscle weakness                                                             SKIN/BREAST:  Denies rash, itching, hair loss, masses                                                                                            PSYCH:  Denies depression, anxiety, suicidal ideation                                                                                               HEME/LYMPH:  Denies bruises easily, enlarged lymph nodes, tender lymph nodes                                        ENDOCRINE:  Denies cold intolerance, heat intolerance, polydipsia                                                                  Vital Signs Last 24 Hrs  T(C): 36.9 (30 Jan 2025 09:26), Max: 36.9 (30 Jan 2025 09:26)  T(F): 98.4 (30 Jan 2025 09:26), Max: 98.4 (30 Jan 2025 09:26)  HR: 86 (30 Jan 2025 12:32) (60 - 86)  BP: 170/78 (30 Jan 2025 12:32) (138/66 - 170/78)  BP(mean): 116 (30 Jan 2025 12:32) (93 - 116)  RR: 16 (30 Jan 2025 12:32) (16 - 17)  SpO2: 95% (30 Jan 2025 12:32) (95% - 97%)    Parameters below as of 30 Jan 2025 12:32  Patient On (Oxygen Delivery Method): room air        Physical Exam   General: Patient lying comfortably in bed, no acute distress     Neurological: Alert and oriented. No focal neurological deficits     Cardiovascular: S1S2, RRR, no murmurs appreciated on exam     Respiratory: Clear to ausculation bilaterally, no wheeze/rhonchi/rales    Gastrointestinal: + BS, soft, non tender, non distended     Extremities: Warm and well perfused. No edema, no calf tenderness     Vascular: 2+ Peripheral pulses b/l                                                             LABS:                        13.4   6.56  )-----------( 166      ( 30 Jan 2025 07:45 )             42.1     01-30    135  |  97  |  14  ----------------------------<  86  4.2   |  29  |  0.89    Ca    8.7      30 Jan 2025 07:45  Phos  2.9     01-30  Mg     1.8     01-30    TPro  6.5  /  Alb  3.5  /  TBili  0.4  /  DBili  x   /  AST  21  /  ALT  11  /  AlkPhos  74  01-29      Urinalysis Basic - ( 30 Jan 2025 07:45 )    Color: x / Appearance: x / SG: x / pH: x  Gluc: 86 mg/dL / Ketone: x  / Bili: x / Urobili: x   Blood: x / Protein: x / Nitrite: x   Leuk Esterase: x / RBC: x / WBC x   Sq Epi: x / Non Sq Epi: x / Bacteria: x

## 2025-01-30 NOTE — DISCHARGE NOTE NURSING/CASE MANAGEMENT/SOCIAL WORK - PATIENT PORTAL LINK FT
You can access the FollowMyHealth Patient Portal offered by [facility] by registering at the following website: [email or web address]. By joining [facility]’s FollowMyHealth portal, you will also be able to view your health information using other applications (apps) compatible with our system.

## 2025-01-30 NOTE — PROGRESS NOTE ADULT - ASSESSMENT
69M with history of biopsy-Dx pulmonary sarcoidosis in 1981 intermittently treated with courses of prednisone, TIA in 2012, HTN, who is admitted for work up of acute RUE weakness and found to have L distal M2 occlusion.    #CVA work up  - CTH: neg  - CTA: distal M2 occlusion  - repeat CTH/CTA for stroke code 1/27 PM: CTP with smaller area of TMAX along L frontoparietal region, CTA with resolution of L distal M2 and proximal M3 occlusion, CTA negative  - bMRI with  acute L MCA infarcts  - MR NOVA no longer indicated per primary team, given MCA occlusion now patent, also no need for bypass work up  - c/w DAPT, on zetia instead of statin as pt reports allergy (?kidney injury)  - TTE/ADDIE with +PFO, borderline normal LV systolic function (50-55%)  - pending bilateral LE dopplers  - PT/OT  - LDL 61, A1c 5.6%, TSH 0.947    #HTN  - BP goal per primary, -200  - resumed on amlodipine 2.5mg qD    #pulmonary sarcoidosis  #pulmonary fibrosis  Follows with Dr. Vazquez and last seen 11/2024 - noted to have pulmonary fibrosis affecting lower lung zones more significantly which may reflect injury from inhalation vs COVID in addition to sarcoid.  - currently on room air with chronic cough otherwise no other respiratory Sx  - no e/o other organ involvement    #EKG abn  - TWI in inferior leads and V4, V5, previously noted on prior documentation  - QTc 470  - had recent stress echo 7/2024 with preserved LVEF 60% and no WMA or e/o ischemia  - TTE here with borderline EF 55-60%  - continue outpatient Cardiology follow up    DVT ppx: lovenox  Diet: DASH  LDA: none  Full code  Dispo: home, no needs

## 2025-01-30 NOTE — DISCHARGE NOTE NURSING/CASE MANAGEMENT/SOCIAL WORK - NSDCFUADDAPPT_GEN_ALL_CORE_FT
Please call the Electrophysiology specialists in 4-6 weeks for follow up. They can be contacted by [phone]    Please follow up with the Structural Heart clinic with Dr. Mares on 2025 at 12:30 pm. If you need to make any changes to your appointment, please call [phone].     Please follow up with your primary care provider within the next two weeks for continued care.     Please call hematology at [phone] to set up an appointment     Please follow up in the stroke clinic within the following 2 weeks. Your appointment is set up for 2025 at 11:00 am. If you need to change this appointment, please call: [phone].     Please call the Orthopedics clinic at [phone] for follow up regarding the baker's cyst.

## 2025-01-30 NOTE — DISCHARGE NOTE NURSING/CASE MANAGEMENT/SOCIAL WORK - NSDCPEFALRISK_GEN_ALL_CORE
For information on Fall & Injury Prevention, visit: [email or web address] OR  [email or web address] OR  https://www.cdc.gov/steadi/patient.html

## 2025-01-30 NOTE — DISCHARGE NOTE NURSING/CASE MANAGEMENT/SOCIAL WORK - FINANCIAL ASSISTANCE
[facility] provides services at a reduced cost to those who are determined to be eligible through [facility]’s financial assistance program. Information regarding [facility]’s financial assistance program can be found by going to [email or web address] or by calling [phone].

## 2025-01-30 NOTE — PROGRESS NOTE ADULT - SUBJECTIVE AND OBJECTIVE BOX
Patient is a 69y old  Male who presents with a chief complaint of TIA rule out (29 Jan 2025 14:19)    INTERVAL EVENTS:  - pending LE dopplers    SUBJECTIVE:  - Patient was seen and examined at bedside  - reports he feels well, no SOB or chest pain  - ambulating well without dizziness  - still having mild slurred speech and mild weakness in RUE  - is looking forward to going home    Review of systems: No CP, dyspnea, nausea or vomiting    Diet, DASH/TLC:   Sodium & Cholesterol Restricted  Kosher (01-29-25 @ 13:26) [Active]    MEDICATIONS  (STANDING):  amLODIPine   Tablet 2.5 milliGRAM(s) Oral daily  aspirin enteric coated 81 milliGRAM(s) Oral daily  clopidogrel Tablet 75 milliGRAM(s) Oral daily  enoxaparin Injectable 40 milliGRAM(s) SubCutaneous every 24 hours  ezetimibe 10 milliGRAM(s) Oral daily  influenza  Vaccine (HIGH DOSE) 0.5 milliLiter(s) IntraMuscular once    MEDICATIONS  (PRN):  Allergies  No Known Allergies  Intolerances    OBJECTIVE:  Vital Signs Last 24 Hrs  T(C): 36.9 (30 Jan 2025 09:26), Max: 36.9 (30 Jan 2025 09:26)  T(F): 98.4 (30 Jan 2025 09:26), Max: 98.4 (30 Jan 2025 09:26)  HR: 82 (30 Jan 2025 08:20) (60 - 82)  BP: 138/66 (30 Jan 2025 08:20) (138/66 - 164/77)  BP(mean): 93 (30 Jan 2025 08:20) (93 - 115)  RR: 17 (30 Jan 2025 08:20) (16 - 17)  SpO2: 95% (30 Jan 2025 08:20) (95% - 97%)    Parameters below as of 30 Jan 2025 08:20  Patient On (Oxygen Delivery Method): room air    I&O's Summary    PHYSICAL EXAM  Gen: Reclining in bed at time of exam, appears younger than stated age, INAD  HEENT: NCAT, MMM  CV: RRR, +S1/S2  Pulm: adequate respiratory effort, no increase in work of breathing on room air, no wheezing or rhonchi  Abd: soft, ND  Skin: warm and dry  Ext: wwp, no peripheral edema  Neuro: AOx3, speaking in full sentences with mildly slurred speech, mildly diminished RUE  strength      LABS:                        13.4   6.56  )-----------( 166      ( 30 Jan 2025 07:45 )             42.1     01-30    135  |  97  |  14  ----------------------------<  86  4.2   |  29  |  0.89    Ca    8.7      30 Jan 2025 07:45  Phos  2.9     01-30  Mg     1.8     01-30    TPro  6.5  /  Alb  3.5  /  TBili  0.4  /  DBili  x   /  AST  21  /  ALT  11  /  AlkPhos  74  01-29    LIVER FUNCTIONS - ( 29 Jan 2025 06:04 )  Alb: 3.5 g/dL / Pro: 6.5 g/dL / ALK PHOS: 74 U/L / ALT: 11 U/L / AST: 21 U/L / GGT: x             CAPILLARY BLOOD GLUCOSE        Urinalysis Basic - ( 30 Jan 2025 07:45 )    Color: x / Appearance: x / SG: x / pH: x  Gluc: 86 mg/dL / Ketone: x  / Bili: x / Urobili: x   Blood: x / Protein: x / Nitrite: x   Leuk Esterase: x / RBC: x / WBC x   Sq Epi: x / Non Sq Epi: x / Bacteria: x        MICRODATA:      RADIOLOGY/OTHER STUDIES:

## 2025-01-31 LAB
APCR PPP: 2.44 RATIO — SIGNIFICANT CHANGE UP
AT III ACT/NOR PPP CHRO: 78 % — LOW (ref 85–135)
B2 GLYCOPROT1 IGA SER QL: <2 U/ML — SIGNIFICANT CHANGE UP
B2 GLYCOPROT1 IGG SER-ACNC: <1.4 U/ML — SIGNIFICANT CHANGE UP
B2 GLYCOPROT1 IGM SER-ACNC: 1.7 U/ML — SIGNIFICANT CHANGE UP
CARDIOLIPIN IGM SER-MCNC: 1.6 MPL U/ML — SIGNIFICANT CHANGE UP
CARDIOLIPIN IGM SER-MCNC: <1.6 GPL U/ML — SIGNIFICANT CHANGE UP
DEPRECATED CARDIOLIPIN IGA SER: <2 APL U/ML — SIGNIFICANT CHANGE UP
HCYS SERPL-MCNC: 7.7 UMOL/L — SIGNIFICANT CHANGE UP
PROT C ACT/NOR PPP: 101 % — SIGNIFICANT CHANGE UP (ref 74–150)

## 2025-02-03 LAB
DRVVT RATIO: 0.86 RATIO — SIGNIFICANT CHANGE UP (ref 0–1.03)
DRVVT SCREEN TO CONFIRM RATIO: SIGNIFICANT CHANGE UP
PROT S FREE PPP-ACNC: 14 % — LOW (ref 63–140)

## 2025-02-04 LAB
DNA PLOIDY SPEC FC-IMP: SIGNIFICANT CHANGE UP
PTR INTERPRETATION: SIGNIFICANT CHANGE UP

## 2025-02-11 ENCOUNTER — APPOINTMENT (OUTPATIENT)
Dept: HEART AND VASCULAR | Facility: CLINIC | Age: 70
End: 2025-02-11

## 2025-02-11 ENCOUNTER — NON-APPOINTMENT (OUTPATIENT)
Age: 70
End: 2025-02-11

## 2025-02-11 VITALS
OXYGEN SATURATION: 96 % | BODY MASS INDEX: 21.31 KG/M2 | SYSTOLIC BLOOD PRESSURE: 143 MMHG | HEART RATE: 68 BPM | TEMPERATURE: 97.3 F | HEIGHT: 76 IN | DIASTOLIC BLOOD PRESSURE: 76 MMHG | WEIGHT: 175 LBS

## 2025-02-11 DIAGNOSIS — R29.898 OTHER SYMPTOMS AND SIGNS INVOLVING THE MUSCULOSKELETAL SYSTEM: ICD-10-CM

## 2025-02-11 DIAGNOSIS — R47.1 DYSARTHRIA AND ANARTHRIA: ICD-10-CM

## 2025-02-11 DIAGNOSIS — Z87.891 PERSONAL HISTORY OF NICOTINE DEPENDENCE: ICD-10-CM

## 2025-02-11 DIAGNOSIS — I10 ESSENTIAL (PRIMARY) HYPERTENSION: ICD-10-CM

## 2025-02-11 DIAGNOSIS — R13.10 DYSPHAGIA, UNSPECIFIED: ICD-10-CM

## 2025-02-11 DIAGNOSIS — D86.0 SARCOIDOSIS OF LUNG: ICD-10-CM

## 2025-02-11 DIAGNOSIS — I63.512 CEREBRAL INFARCTION DUE TO UNSPECIFIED OCCLUSION OR STENOSIS OF LEFT MIDDLE CEREBRAL ARTERY: ICD-10-CM

## 2025-02-11 DIAGNOSIS — R29.701 NIHSS SCORE 1: ICD-10-CM

## 2025-02-11 DIAGNOSIS — I82.511 CHRONIC EMBOLISM AND THROMBOSIS OF RIGHT FEMORAL VEIN: ICD-10-CM

## 2025-02-11 DIAGNOSIS — I82.442 ACUTE EMBOLISM AND THROMBOSIS OF LEFT TIBIAL VEIN: ICD-10-CM

## 2025-02-11 DIAGNOSIS — R47.01 APHASIA: ICD-10-CM

## 2025-02-11 DIAGNOSIS — E11.9 TYPE 2 DIABETES MELLITUS WITHOUT COMPLICATIONS: ICD-10-CM

## 2025-02-11 DIAGNOSIS — Z79.01 LONG TERM (CURRENT) USE OF ANTICOAGULANTS: ICD-10-CM

## 2025-02-11 DIAGNOSIS — F12.90 CANNABIS USE, UNSPECIFIED, UNCOMPLICATED: ICD-10-CM

## 2025-02-11 DIAGNOSIS — J84.10 PULMONARY FIBROSIS, UNSPECIFIED: ICD-10-CM

## 2025-02-11 DIAGNOSIS — Q21.12 PATENT FORAMEN OVALE: ICD-10-CM

## 2025-02-11 PROCEDURE — 99214 OFFICE O/P EST MOD 30 MIN: CPT

## 2025-02-13 ENCOUNTER — APPOINTMENT (OUTPATIENT)
Dept: NEUROLOGY | Facility: CLINIC | Age: 70
End: 2025-02-13
Payer: COMMERCIAL

## 2025-02-13 ENCOUNTER — APPOINTMENT (OUTPATIENT)
Dept: PULMONOLOGY | Facility: CLINIC | Age: 70
End: 2025-02-13
Payer: COMMERCIAL

## 2025-02-13 VITALS
BODY MASS INDEX: 22.18 KG/M2 | TEMPERATURE: 96.98 F | OXYGEN SATURATION: 97 % | SYSTOLIC BLOOD PRESSURE: 110 MMHG | WEIGHT: 182.2 LBS | HEART RATE: 88 BPM | DIASTOLIC BLOOD PRESSURE: 71 MMHG

## 2025-02-13 DIAGNOSIS — R06.09 OTHER FORMS OF DYSPNEA: ICD-10-CM

## 2025-02-13 DIAGNOSIS — D86.0 SARCOIDOSIS OF LUNG: ICD-10-CM

## 2025-02-13 DIAGNOSIS — I82.492 ACUTE EMBOLISM AND THROMBOSIS OF OTHER SPECIFIED DEEP VEIN OF LEFT LOWER EXTREMITY: ICD-10-CM

## 2025-02-13 DIAGNOSIS — I63.512 CEREBRAL INFARCTION DUE TO UNSPECIFIED OCCLUSION OR STENOSIS OF LEFT MIDDLE CEREBRAL ARTERY: ICD-10-CM

## 2025-02-13 DIAGNOSIS — J84.9 INTERSTITIAL PULMONARY DISEASE, UNSPECIFIED: ICD-10-CM

## 2025-02-13 DIAGNOSIS — R47.01 APHASIA: ICD-10-CM

## 2025-02-13 DIAGNOSIS — Q21.12 PATENT FORAMEN OVALE: ICD-10-CM

## 2025-02-13 PROCEDURE — 94618 PULMONARY STRESS TESTING: CPT

## 2025-02-13 PROCEDURE — 93886 INTRACRANIAL COMPLETE STUDY: CPT

## 2025-02-13 PROCEDURE — 94727 GAS DIL/WSHOT DETER LNG VOL: CPT

## 2025-02-13 PROCEDURE — 94729 DIFFUSING CAPACITY: CPT

## 2025-02-13 PROCEDURE — 99215 OFFICE O/P EST HI 40 MIN: CPT | Mod: 25

## 2025-02-13 PROCEDURE — 93880 EXTRACRANIAL BILAT STUDY: CPT

## 2025-02-13 PROCEDURE — ZZZZZ: CPT

## 2025-02-13 PROCEDURE — 99204 OFFICE O/P NEW MOD 45 MIN: CPT

## 2025-02-13 RX ORDER — APIXABAN 5 MG/1
5 TABLET, FILM COATED ORAL
Refills: 0 | Status: ACTIVE | COMMUNITY

## 2025-02-13 RX ORDER — EZETIMIBE 10 MG/1
10 TABLET ORAL DAILY
Refills: 0 | Status: ACTIVE | COMMUNITY

## 2025-02-14 ENCOUNTER — LABORATORY RESULT (OUTPATIENT)
Age: 70
End: 2025-02-14

## 2025-02-14 ENCOUNTER — NON-APPOINTMENT (OUTPATIENT)
Age: 70
End: 2025-02-14

## 2025-02-17 ENCOUNTER — NON-APPOINTMENT (OUTPATIENT)
Age: 70
End: 2025-02-17

## 2025-02-18 ENCOUNTER — NON-APPOINTMENT (OUTPATIENT)
Age: 70
End: 2025-02-18

## 2025-02-18 ENCOUNTER — APPOINTMENT (OUTPATIENT)
Dept: ORTHOPEDIC SURGERY | Facility: CLINIC | Age: 70
End: 2025-02-18
Payer: COMMERCIAL

## 2025-02-18 VITALS — BODY MASS INDEX: 22.16 KG/M2 | HEIGHT: 76 IN | WEIGHT: 182 LBS

## 2025-02-18 DIAGNOSIS — M17.12 UNILATERAL PRIMARY OSTEOARTHRITIS, LEFT KNEE: ICD-10-CM

## 2025-02-18 PROCEDURE — 99203 OFFICE O/P NEW LOW 30 MIN: CPT

## 2025-02-18 PROCEDURE — 73590 X-RAY EXAM OF LOWER LEG: CPT | Mod: LT

## 2025-02-19 ENCOUNTER — NON-APPOINTMENT (OUTPATIENT)
Age: 70
End: 2025-02-19

## 2025-02-19 PROBLEM — M17.12 ARTHRITIS OF KNEE, LEFT: Status: ACTIVE | Noted: 2025-02-19

## 2025-02-20 ENCOUNTER — APPOINTMENT (OUTPATIENT)
Dept: HEART AND VASCULAR | Facility: CLINIC | Age: 70
End: 2025-02-20
Payer: COMMERCIAL

## 2025-02-20 ENCOUNTER — NON-APPOINTMENT (OUTPATIENT)
Age: 70
End: 2025-02-20

## 2025-02-20 VITALS
HEIGHT: 76 IN | WEIGHT: 174 LBS | HEART RATE: 82 BPM | DIASTOLIC BLOOD PRESSURE: 77 MMHG | TEMPERATURE: 201.9 F | SYSTOLIC BLOOD PRESSURE: 130 MMHG | BODY MASS INDEX: 21.19 KG/M2 | OXYGEN SATURATION: 96 %

## 2025-02-20 DIAGNOSIS — Z86.73 PERSONAL HISTORY OF TRANSIENT ISCHEMIC ATTACK (TIA), AND CEREBRAL INFARCTION W/OUT RESIDUAL DEFICITS: ICD-10-CM

## 2025-02-20 PROCEDURE — 93291 INTERROG DEV EVAL SCRMS IP: CPT

## 2025-02-21 ENCOUNTER — NON-APPOINTMENT (OUTPATIENT)
Age: 70
End: 2025-02-21

## 2025-03-10 ENCOUNTER — NON-APPOINTMENT (OUTPATIENT)
Age: 70
End: 2025-03-10

## 2025-03-27 ENCOUNTER — APPOINTMENT (OUTPATIENT)
Dept: HEART AND VASCULAR | Facility: CLINIC | Age: 70
End: 2025-03-27

## 2025-03-27 PROCEDURE — 93298 REM INTERROG DEV EVAL SCRMS: CPT

## 2025-04-01 ENCOUNTER — APPOINTMENT (OUTPATIENT)
Dept: HEMATOLOGY ONCOLOGY | Facility: CLINIC | Age: 70
End: 2025-04-01

## 2025-04-01 VITALS
TEMPERATURE: 207.68 F | RESPIRATION RATE: 18 BRPM | HEIGHT: 76 IN | WEIGHT: 185.13 LBS | BODY MASS INDEX: 22.54 KG/M2 | OXYGEN SATURATION: 96 % | HEART RATE: 81 BPM | SYSTOLIC BLOOD PRESSURE: 131 MMHG | DIASTOLIC BLOOD PRESSURE: 73 MMHG

## 2025-04-01 DIAGNOSIS — I82.492 ACUTE EMBOLISM AND THROMBOSIS OF OTHER SPECIFIED DEEP VEIN OF LEFT LOWER EXTREMITY: ICD-10-CM

## 2025-04-01 PROCEDURE — 99214 OFFICE O/P EST MOD 30 MIN: CPT

## 2025-04-03 ENCOUNTER — NON-APPOINTMENT (OUTPATIENT)
Age: 70
End: 2025-04-03

## 2025-04-03 ENCOUNTER — RESULT REVIEW (OUTPATIENT)
Age: 70
End: 2025-04-03

## 2025-04-03 ENCOUNTER — OUTPATIENT (OUTPATIENT)
Dept: OUTPATIENT SERVICES | Facility: HOSPITAL | Age: 70
LOS: 1 days | End: 2025-04-03
Payer: COMMERCIAL

## 2025-04-03 DIAGNOSIS — Z98.89 OTHER SPECIFIED POSTPROCEDURAL STATES: Chronic | ICD-10-CM

## 2025-04-03 DIAGNOSIS — I82.492 ACUTE EMBOLISM AND THROMBOSIS OF OTHER SPECIFIED DEEP VEIN OF LEFT LOWER EXTREMITY: ICD-10-CM

## 2025-04-03 DIAGNOSIS — M25.562 PAIN IN LEFT KNEE: Chronic | ICD-10-CM

## 2025-04-03 PROCEDURE — 93351 STRESS TTE COMPLETE: CPT | Mod: 26

## 2025-04-03 PROCEDURE — 93351 STRESS TTE COMPLETE: CPT

## 2025-04-23 PROBLEM — I63.9 CVA (CEREBROVASCULAR ACCIDENT): Status: ACTIVE | Noted: 2025-04-23

## 2025-05-01 ENCOUNTER — APPOINTMENT (OUTPATIENT)
Dept: HEART AND VASCULAR | Facility: CLINIC | Age: 70
End: 2025-05-01
Payer: COMMERCIAL

## 2025-05-01 ENCOUNTER — NON-APPOINTMENT (OUTPATIENT)
Age: 70
End: 2025-05-01

## 2025-05-01 PROCEDURE — 93298 REM INTERROG DEV EVAL SCRMS: CPT

## 2025-05-22 NOTE — CONSULT NOTE ADULT - CONSULT REASON
Pt was contacted and notified of malignant results at this time.  Pt is agreeable to treatment plan. No further questions noted. Referral pended to CNP at this time.     Abbey Mueller RN      
PFO
PM&R
co-management

## 2025-05-27 ENCOUNTER — APPOINTMENT (OUTPATIENT)
Dept: PULMONOLOGY | Facility: CLINIC | Age: 70
End: 2025-05-27
Payer: COMMERCIAL

## 2025-05-27 VITALS
OXYGEN SATURATION: 96 % | BODY MASS INDEX: 22.04 KG/M2 | HEIGHT: 76 IN | TEMPERATURE: 207.14 F | DIASTOLIC BLOOD PRESSURE: 80 MMHG | HEART RATE: 84 BPM | RESPIRATION RATE: 13 BRPM | WEIGHT: 181 LBS | SYSTOLIC BLOOD PRESSURE: 143 MMHG

## 2025-05-27 DIAGNOSIS — J84.9 INTERSTITIAL PULMONARY DISEASE, UNSPECIFIED: ICD-10-CM

## 2025-05-27 DIAGNOSIS — I82.492 ACUTE EMBOLISM AND THROMBOSIS OF OTHER SPECIFIED DEEP VEIN OF LEFT LOWER EXTREMITY: ICD-10-CM

## 2025-05-27 DIAGNOSIS — D86.0 SARCOIDOSIS OF LUNG: ICD-10-CM

## 2025-05-27 DIAGNOSIS — Q21.12 PATENT FORAMEN OVALE: ICD-10-CM

## 2025-05-27 PROCEDURE — 99214 OFFICE O/P EST MOD 30 MIN: CPT

## 2025-05-27 RX ORDER — PREDNISONE 20 MG/1
20 TABLET ORAL
Refills: 0 | Status: ACTIVE | COMMUNITY

## 2025-06-05 ENCOUNTER — APPOINTMENT (OUTPATIENT)
Dept: HEART AND VASCULAR | Facility: CLINIC | Age: 70
End: 2025-06-05

## 2025-06-05 PROCEDURE — 93298 REM INTERROG DEV EVAL SCRMS: CPT

## 2025-07-07 ENCOUNTER — APPOINTMENT (OUTPATIENT)
Dept: NEUROLOGY | Facility: CLINIC | Age: 70
End: 2025-07-07

## 2025-07-10 ENCOUNTER — APPOINTMENT (OUTPATIENT)
Dept: HEART AND VASCULAR | Facility: CLINIC | Age: 70
End: 2025-07-10

## 2025-07-14 ENCOUNTER — OUTPATIENT (OUTPATIENT)
Dept: OUTPATIENT SERVICES | Facility: HOSPITAL | Age: 70
LOS: 1 days | Discharge: HOME | End: 2025-07-14
Payer: COMMERCIAL

## 2025-07-14 DIAGNOSIS — Z98.89 OTHER SPECIFIED POSTPROCEDURAL STATES: Chronic | ICD-10-CM

## 2025-07-14 DIAGNOSIS — M25.562 PAIN IN LEFT KNEE: Chronic | ICD-10-CM

## 2025-07-14 PROCEDURE — 33286 RMVL SUBQ CAR RHYTHM MNTR: CPT

## 2025-07-16 ENCOUNTER — APPOINTMENT (OUTPATIENT)
Dept: NEUROLOGY | Facility: CLINIC | Age: 70
End: 2025-07-16
Payer: COMMERCIAL

## 2025-07-16 VITALS
BODY MASS INDEX: 22.41 KG/M2 | HEIGHT: 76 IN | TEMPERATURE: 206.24 F | SYSTOLIC BLOOD PRESSURE: 123 MMHG | OXYGEN SATURATION: 97 % | WEIGHT: 184 LBS | HEART RATE: 72 BPM | DIASTOLIC BLOOD PRESSURE: 77 MMHG

## 2025-07-16 PROCEDURE — 99214 OFFICE O/P EST MOD 30 MIN: CPT

## 2025-07-16 RX ORDER — PREDNISONE 10 MG/1
10 TABLET ORAL
Refills: 0 | Status: ACTIVE | COMMUNITY

## 2025-07-29 ENCOUNTER — APPOINTMENT (OUTPATIENT)
Dept: HEMATOLOGY ONCOLOGY | Facility: CLINIC | Age: 70
End: 2025-07-29

## 2025-07-31 ENCOUNTER — APPOINTMENT (OUTPATIENT)
Dept: HEART AND VASCULAR | Facility: CLINIC | Age: 70
End: 2025-07-31

## 2025-07-31 VITALS
WEIGHT: 184 LBS | DIASTOLIC BLOOD PRESSURE: 75 MMHG | BODY MASS INDEX: 22.41 KG/M2 | TEMPERATURE: 208.4 F | OXYGEN SATURATION: 96 % | HEART RATE: 102 BPM | HEIGHT: 76 IN | SYSTOLIC BLOOD PRESSURE: 127 MMHG

## 2025-08-07 ENCOUNTER — APPOINTMENT (OUTPATIENT)
Dept: RHEUMATOLOGY | Facility: CLINIC | Age: 70
End: 2025-08-07
Payer: COMMERCIAL

## 2025-08-07 VITALS
OXYGEN SATURATION: 95 % | BODY MASS INDEX: 22.53 KG/M2 | DIASTOLIC BLOOD PRESSURE: 79 MMHG | WEIGHT: 185 LBS | HEART RATE: 87 BPM | TEMPERATURE: 206.96 F | SYSTOLIC BLOOD PRESSURE: 128 MMHG | HEIGHT: 76 IN

## 2025-08-07 DIAGNOSIS — J84.9 INTERSTITIAL PULMONARY DISEASE, UNSPECIFIED: ICD-10-CM

## 2025-08-07 PROCEDURE — 36415 COLL VENOUS BLD VENIPUNCTURE: CPT

## 2025-08-07 PROCEDURE — 99205 OFFICE O/P NEW HI 60 MIN: CPT | Mod: 25

## 2025-08-19 ENCOUNTER — APPOINTMENT (OUTPATIENT)
Dept: HEART AND VASCULAR | Facility: CLINIC | Age: 70
End: 2025-08-19

## 2025-09-02 ENCOUNTER — APPOINTMENT (OUTPATIENT)
Dept: HEMATOLOGY ONCOLOGY | Facility: CLINIC | Age: 70
End: 2025-09-02
Payer: COMMERCIAL

## 2025-09-02 VITALS
WEIGHT: 177.38 LBS | OXYGEN SATURATION: 95 % | TEMPERATURE: 97.6 F | SYSTOLIC BLOOD PRESSURE: 103 MMHG | HEIGHT: 76 IN | BODY MASS INDEX: 21.6 KG/M2 | RESPIRATION RATE: 18 BRPM | HEART RATE: 99 BPM | DIASTOLIC BLOOD PRESSURE: 68 MMHG

## 2025-09-02 DIAGNOSIS — I82.492 ACUTE EMBOLISM AND THROMBOSIS OF OTHER SPECIFIED DEEP VEIN OF LEFT LOWER EXTREMITY: ICD-10-CM

## 2025-09-02 DIAGNOSIS — I63.9 CEREBRAL INFARCTION, UNSPECIFIED: ICD-10-CM

## 2025-09-02 PROCEDURE — 99204 OFFICE O/P NEW MOD 45 MIN: CPT
